# Patient Record
Sex: FEMALE | Race: BLACK OR AFRICAN AMERICAN | Employment: FULL TIME | URBAN - METROPOLITAN AREA
[De-identification: names, ages, dates, MRNs, and addresses within clinical notes are randomized per-mention and may not be internally consistent; named-entity substitution may affect disease eponyms.]

---

## 2017-05-18 ENCOUNTER — TELEPHONE (OUTPATIENT)
Dept: FAMILY MEDICINE | Facility: CLINIC | Age: 27
End: 2017-05-18

## 2017-05-18 NOTE — LETTER
[facility]  [address]  [address]  [facility] [address] [phone]                                                                                                           Sathya Nelson  [address]   [facility] [address]    May 18, 2017          Dear Sathya Nelson,      We care about your well-being!  In order to ensure we are providing the best quality care, we have reviewed your chart and see that you are due for:     ___x__ Physical   ___x__ Pap Smear   _____ Mammogram   _____ Diabetes Followup   _____ Hypertension Followup   _____ Cholesterol Followup (Provider Appointment)   _____ Cholesterol Test (Lab-Only Appointment)   _____ Other:       We can assist you in scheduling these appointments at [phone].    If you have had (or plan to have) any of these tests done at a facility other than [facility] or a [facility], please have the results from these tests sent to your primary physician at [facility].           Sincerely,    Nicole Siegler, PA

## 2017-05-18 NOTE — TELEPHONE ENCOUNTER
Panel Management Review      Patient has the following on her problem list: None      Composite cancer screening  Chart review shows that this patient is due/due soon for the following Pap Smear  Summary:    Patient is due/failing the following:   PAP and PHYSICAL    Action needed:   Patient needs office visit for Pap/Physical.    Type of outreach:    Sent letter.    Questions for provider review:    None                                                                                                                                    Jayda Fleming LPN     Chart routed to Care Team .

## 2017-10-01 ENCOUNTER — APPOINTMENT (OUTPATIENT)
Dept: GENERAL RADIOLOGY | Facility: CLINIC | Age: 27
End: 2017-10-01
Attending: EMERGENCY MEDICINE
Payer: COMMERCIAL

## 2017-10-01 ENCOUNTER — NURSE TRIAGE (OUTPATIENT)
Dept: NURSING | Facility: CLINIC | Age: 27
End: 2017-10-01

## 2017-10-01 ENCOUNTER — APPOINTMENT (OUTPATIENT)
Dept: CT IMAGING | Facility: CLINIC | Age: 27
End: 2017-10-01
Attending: EMERGENCY MEDICINE
Payer: COMMERCIAL

## 2017-10-01 ENCOUNTER — HOSPITAL ENCOUNTER (EMERGENCY)
Facility: CLINIC | Age: 27
Discharge: HOME | End: 2017-10-02
Attending: EMERGENCY MEDICINE | Admitting: EMERGENCY MEDICINE
Payer: COMMERCIAL

## 2017-10-01 DIAGNOSIS — R51.9 NONINTRACTABLE HEADACHE, UNSPECIFIED CHRONICITY PATTERN, UNSPECIFIED HEADACHE TYPE: ICD-10-CM

## 2017-10-01 DIAGNOSIS — M25.511 ACUTE PAIN OF RIGHT SHOULDER: ICD-10-CM

## 2017-10-01 DIAGNOSIS — S06.9X1A TRAUMATIC BRAIN INJURY, WITH LOSS OF CONSCIOUSNESS OF 30 MINUTES OR LESS, INITIAL ENCOUNTER (H): Primary | ICD-10-CM

## 2017-10-01 DIAGNOSIS — M54.2 NECK PAIN: ICD-10-CM
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
## 2017-10-01 DIAGNOSIS — M25.561 ACUTE PAIN OF RIGHT KNEE: ICD-10-CM

## 2017-10-01 DIAGNOSIS — M25.562 ACUTE PAIN OF LEFT KNEE: ICD-10-CM

## 2017-10-01 LAB
ALBUMIN UR-MCNC: NEGATIVE MG/DL
ANION GAP SERPL CALCULATED.3IONS-SCNC: 8 MMOL/L (ref 3–14)
APPEARANCE UR: CLEAR
BACTERIA #/AREA URNS HPF: ABNORMAL /HPF
BASOPHILS # BLD AUTO: 0.1 10E9/L (ref 0–0.2)
BASOPHILS NFR BLD AUTO: 0.6 %
BILIRUB UR QL STRIP: NEGATIVE
BUN SERPL-MCNC: 6 MG/DL (ref 7–30)
CALCIUM SERPL-MCNC: 8.5 MG/DL (ref 8.5–10.1)
CHLORIDE SERPL-SCNC: 105 MMOL/L (ref 94–109)
CO2 SERPL-SCNC: 24 MMOL/L (ref 20–32)
COLOR UR AUTO: ABNORMAL
CREAT SERPL-MCNC: 0.52 MG/DL (ref 0.52–1.04)
DIFFERENTIAL METHOD BLD: NORMAL
EOSINOPHIL # BLD AUTO: 0.1 10E9/L (ref 0–0.7)
EOSINOPHIL NFR BLD AUTO: 0.7 %
ERYTHROCYTE [DISTWIDTH] IN BLOOD BY AUTOMATED COUNT: 14 % (ref 10–15)
GFR SERPL CREATININE-BSD FRML MDRD: >90 ML/MIN/1.7M2
GLUCOSE SERPL-MCNC: 84 MG/DL (ref 70–99)
GLUCOSE UR STRIP-MCNC: NEGATIVE MG/DL
HCG UR QL: NEGATIVE
HCT VFR BLD AUTO: 35.4 % (ref 35–47)
HGB BLD-MCNC: 12 G/DL (ref 11.7–15.7)
HGB UR QL STRIP: NEGATIVE
IMM GRANULOCYTES # BLD: 0 10E9/L (ref 0–0.4)
IMM GRANULOCYTES NFR BLD: 0.2 %
KETONES UR STRIP-MCNC: NEGATIVE MG/DL
LEUKOCYTE ESTERASE UR QL STRIP: NEGATIVE
LYMPHOCYTES # BLD AUTO: 2 10E9/L (ref 0.8–5.3)
LYMPHOCYTES NFR BLD AUTO: 21.9 %
MCH RBC QN AUTO: 29.6 PG (ref 26.5–33)
MCHC RBC AUTO-ENTMCNC: 33.9 G/DL (ref 31.5–36.5)
MCV RBC AUTO: 87 FL (ref 78–100)
MONOCYTES # BLD AUTO: 1.2 10E9/L (ref 0–1.3)
MONOCYTES NFR BLD AUTO: 13.7 %
MUCOUS THREADS #/AREA URNS LPF: PRESENT /LPF
NEUTROPHILS # BLD AUTO: 5.7 10E9/L (ref 1.6–8.3)
NEUTROPHILS NFR BLD AUTO: 62.9 %
NITRATE UR QL: NEGATIVE
NRBC # BLD AUTO: 0 10*3/UL
NRBC BLD AUTO-RTO: 0 /100
PH UR STRIP: 6.5 PH (ref 5–7)
PLATELET # BLD AUTO: 223 10E9/L (ref 150–450)
POTASSIUM SERPL-SCNC: 3.4 MMOL/L (ref 3.4–5.3)
RBC # BLD AUTO: 4.05 10E12/L (ref 3.8–5.2)
RBC #/AREA URNS AUTO: <1 /HPF (ref 0–2)
SODIUM SERPL-SCNC: 137 MMOL/L (ref 133–144)
SOURCE: ABNORMAL
SP GR UR STRIP: 1.01 (ref 1–1.03)
SQUAMOUS #/AREA URNS AUTO: 1 /HPF (ref 0–1)
UROBILINOGEN UR STRIP-MCNC: NORMAL MG/DL (ref 0–2)
WBC # BLD AUTO: 9.1 10E9/L (ref 4–11)
WBC #/AREA URNS AUTO: 1 /HPF (ref 0–2)

## 2017-10-01 PROCEDURE — 72125 CT NECK SPINE W/O DYE: CPT

## 2017-10-01 PROCEDURE — 85025 COMPLETE CBC W/AUTO DIFF WBC: CPT | Performed by: EMERGENCY MEDICINE

## 2017-10-01 PROCEDURE — 96374 THER/PROPH/DIAG INJ IV PUSH: CPT

## 2017-10-01 PROCEDURE — 81025 URINE PREGNANCY TEST: CPT | Performed by: EMERGENCY MEDICINE

## 2017-10-01 PROCEDURE — 73030 X-RAY EXAM OF SHOULDER: CPT | Mod: RT

## 2017-10-01 PROCEDURE — 70450 CT HEAD/BRAIN W/O DYE: CPT

## 2017-10-01 PROCEDURE — 25000128 H RX IP 250 OP 636: Performed by: EMERGENCY MEDICINE

## 2017-10-01 PROCEDURE — 76705 ECHO EXAM OF ABDOMEN: CPT

## 2017-10-01 PROCEDURE — 73562 X-RAY EXAM OF KNEE 3: CPT | Mod: 50

## 2017-10-01 PROCEDURE — 80048 BASIC METABOLIC PNL TOTAL CA: CPT | Performed by: EMERGENCY MEDICINE

## 2017-10-01 PROCEDURE — 96375 TX/PRO/DX INJ NEW DRUG ADDON: CPT

## 2017-10-01 PROCEDURE — 99285 EMERGENCY DEPT VISIT HI MDM: CPT | Mod: 25

## 2017-10-01 PROCEDURE — 71010 XR CHEST 1 VW: CPT

## 2017-10-01 PROCEDURE — 81001 URINALYSIS AUTO W/SCOPE: CPT | Performed by: EMERGENCY MEDICINE

## 2017-10-01 PROCEDURE — 96361 HYDRATE IV INFUSION ADD-ON: CPT

## 2017-10-01 RX ORDER — IBUPROFEN 600 MG/1
600 TABLET, FILM COATED ORAL EVERY 8 HOURS PRN
Qty: 30 TABLET | Refills: 0 | Status: ON HOLD | OUTPATIENT
Start: 2017-10-01 | End: 2021-02-11

## 2017-10-01 RX ORDER — ONDANSETRON 4 MG/1
4 TABLET, ORALLY DISINTEGRATING ORAL EVERY 8 HOURS PRN
Qty: 10 TABLET | Refills: 0 | Status: SHIPPED | OUTPATIENT
Start: 2017-10-01 | End: 2017-10-01

## 2017-10-01 RX ORDER — IBUPROFEN 600 MG/1
600 TABLET, FILM COATED ORAL EVERY 8 HOURS PRN
Qty: 30 TABLET | Refills: 0 | Status: SHIPPED | OUTPATIENT
Start: 2017-10-01 | End: 2017-10-01

## 2017-10-01 RX ORDER — ONDANSETRON 4 MG/1
4 TABLET, ORALLY DISINTEGRATING ORAL EVERY 8 HOURS PRN
Qty: 10 TABLET | Refills: 0 | Status: ON HOLD | OUTPATIENT
Start: 2017-10-01 | End: 2021-03-21

## 2017-10-01 RX ORDER — OXYCODONE AND ACETAMINOPHEN 5; 325 MG/1; MG/1
1-2 TABLET ORAL EVERY 4 HOURS PRN
Qty: 15 TABLET | Refills: 0 | Status: ON HOLD | OUTPATIENT
Start: 2017-10-01 | End: 2021-02-11

## 2017-10-01 RX ORDER — HYDROMORPHONE HYDROCHLORIDE 1 MG/ML
0.5 INJECTION, SOLUTION INTRAMUSCULAR; INTRAVENOUS; SUBCUTANEOUS
Status: DISCONTINUED | OUTPATIENT
Start: 2017-10-01 | End: 2017-10-02 | Stop reason: HOSPADM

## 2017-10-01 RX ORDER — OXYCODONE AND ACETAMINOPHEN 5; 325 MG/1; MG/1
1-2 TABLET ORAL EVERY 4 HOURS PRN
Qty: 15 TABLET | Refills: 0 | Status: SHIPPED | OUTPATIENT
Start: 2017-10-01 | End: 2017-10-01

## 2017-10-01 RX ORDER — ONDANSETRON 2 MG/ML
4 INJECTION INTRAMUSCULAR; INTRAVENOUS ONCE
Status: COMPLETED | OUTPATIENT
Start: 2017-10-01 | End: 2017-10-01

## 2017-10-01 RX ADMIN — SODIUM CHLORIDE 1000 ML: 9 INJECTION, SOLUTION INTRAVENOUS at 22:40

## 2017-10-01 RX ADMIN — ONDANSETRON 4 MG: 2 SOLUTION INTRAMUSCULAR; INTRAVENOUS at 22:41

## 2017-10-01 RX ADMIN — HYDROMORPHONE HYDROCHLORIDE 0.5 MG: 1 INJECTION, SOLUTION INTRAMUSCULAR; INTRAVENOUS; SUBCUTANEOUS at 22:42

## 2017-10-01 ASSESSMENT — ENCOUNTER SYMPTOMS
NECK PAIN: 1
HEADACHES: 1
SHORTNESS OF BREATH: 1

## 2017-10-01 NOTE — ED AVS SNAPSHOT
Emergency Department    [address] [address]    Phone:  [phone]    Fax:  [phone]                                       Sathya Nelson   MRN: [id number]    Department:   Emergency Department   Date of Visit:  10/1/2017           After Visit Summary Signature Page     I have received my discharge instructions, and my questions have been answered. I have discussed any challenges I see with this plan with the nurse or doctor.    ..........................................................................................................................................  Patient/Patient Representative Signature      ..........................................................................................................................................  Patient Representative Print Name and Relationship to Patient    ..................................................               ................................................  Date                                            Time    ..........................................................................................................................................  Reviewed by Signature/Title    ...................................................              ..............................................  Date                                                            Time
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
## 2017-10-01 NOTE — TELEPHONE ENCOUNTER
"  Reason for Disposition    [1] MILD-MODERATE pain AND [2] constant AND [3] present > 2 hours    Additional Information    Negative: Shock suspected (e.g., cold/pale/clammy skin, too weak to stand, low BP, rapid pulse)    Negative: Difficult to awaken or acting confused  (e.g., disoriented, slurred speech)    Negative: Passed out (i.e., lost consciousness, collapsed and was not responding)    Negative: Sounds like a life-threatening emergency to the triager    Negative: Chest pain    Negative: Pain is mainly in upper abdomen  (if needed ask: \"is it mainly above the belly button?\")    Negative: Followed an abdomen (stomach) injury    Negative: [1] Abdominal pain AND [2] pregnant < 20 weeks    Negative: [1] Abdominal pain AND [2] pregnant > 20 weeks    Negative: [1] Abdominal pain AND [2] postpartum < 1 month since delivery    Negative: [1] SEVERE pain (e.g., excruciating) AND [2] present > 1 hour    Negative: [1] SEVERE pain AND [2] age > 60    Negative: [1] Vomiting AND [2] contains red blood or black (\"coffee ground\") material  (Exception: few red streaks in vomit that only happened once)    Negative: Blood in bowel movements   (Exception: blood on surface of BM with constipation)    Negative: Black or tarry bowel movements  (Exception: chronic-unchanged  black-grey bowel movements AND is taking iron pills or Pepto-bismol)    Negative: Patient sounds very sick or weak to the triager    Protocols used: ABDOMINAL PAIN - FEMALE-ADULT-  Patient is having chest pain and abdominal pain on left side. Patient states she feels anxious with the pains. Triager advised patient to be seen in ED  "

## 2017-10-02 VITALS
DIASTOLIC BLOOD PRESSURE: 57 MMHG | OXYGEN SATURATION: 100 % | TEMPERATURE: 99.3 F | RESPIRATION RATE: 20 BRPM | SYSTOLIC BLOOD PRESSURE: 92 MMHG | HEIGHT: 65 IN
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
## 2017-10-04 ENCOUNTER — OFFICE VISIT (OUTPATIENT)
Dept: INTERNAL MEDICINE | Facility: CLINIC | Age: 27
End: 2017-10-04
Payer: COMMERCIAL

## 2017-10-04 VITALS
DIASTOLIC BLOOD PRESSURE: 62 MMHG | TEMPERATURE: 98.6 F | OXYGEN SATURATION: 99 % | HEIGHT: 65 IN | SYSTOLIC BLOOD PRESSURE: 100 MMHG | HEART RATE: 66 BPM | WEIGHT: 128.2 LBS | BODY MASS INDEX: 21.36 KG/M2

## 2017-10-04 DIAGNOSIS — S06.0X9D CONCUSSION WITH LOSS OF CONSCIOUSNESS, SUBSEQUENT ENCOUNTER: ICD-10-CM

## 2017-10-04 DIAGNOSIS — S29.012D UPPER BACK STRAIN, SUBSEQUENT ENCOUNTER: ICD-10-CM

## 2017-10-04 DIAGNOSIS — S06.9X9A TRAUMATIC BRAIN INJURY WITH BRIEF (LESS THAN 1 HOUR) LOSS OF CONSCIOUSNESS (H): ICD-10-CM
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
## 2017-10-04 PROCEDURE — 99214 OFFICE O/P EST MOD 30 MIN: CPT | Performed by: PHYSICIAN ASSISTANT

## 2017-10-04 RX ORDER — HYDROCODONE BITARTRATE AND ACETAMINOPHEN 5; 325 MG/1; MG/1
1 TABLET ORAL EVERY 6 HOURS PRN
Qty: 10 TABLET | Refills: 0 | Status: ON HOLD | OUTPATIENT
Start: 2017-10-04 | End: 2021-02-11

## 2017-10-04 RX ORDER — TIZANIDINE 2 MG/1
2 TABLET ORAL 3 TIMES DAILY
Qty: 30 TABLET | Refills: 1 | Status: ON HOLD | OUTPATIENT
Start: 2017-10-04 | End: 2021-03-21

## 2017-10-04 NOTE — NURSING NOTE
"Chief Complaint   Patient presents with     Hospital F/U     10/01 [facility]        Initial /62 (BP Location: Left arm, Patient Position: Chair, Cuff Size: Adult Regular)  Pulse 66  Temp 98.6  F (37  C) (Oral)  Ht 5' 5\" (1.651 m)  Wt 128 lb 3.2 oz (58.2 kg)  LMP 09/20/2017  SpO2 99%  BMI 21.33 kg/m2 Estimated body mass index is 21.33 kg/(m^2) as calculated from the following:    Height as of this encounter: 5' 5\" (1.651 m).    Weight as of this encounter: 128 lb 3.2 oz (58.2 kg).  Medication Reconciliation: complete    "
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
## 2017-10-14 ENCOUNTER — HOSPITAL ENCOUNTER (EMERGENCY)
Facility: CLINIC | Age: 27
Discharge: HOME | End: 2017-10-15
Attending: EMERGENCY MEDICINE | Admitting: EMERGENCY MEDICINE

## 2017-10-14 DIAGNOSIS — R11.2 NON-INTRACTABLE VOMITING WITH NAUSEA, UNSPECIFIED VOMITING TYPE: ICD-10-CM

## 2017-10-14 PROCEDURE — 99284 EMERGENCY DEPT VISIT MOD MDM: CPT | Mod: 25

## 2017-10-14 PROCEDURE — 96361 HYDRATE IV INFUSION ADD-ON: CPT

## 2017-10-14 PROCEDURE — 96374 THER/PROPH/DIAG INJ IV PUSH: CPT

## 2017-10-15 VITALS
HEART RATE: 89 BPM | SYSTOLIC BLOOD PRESSURE: 101 MMHG | TEMPERATURE: 98.9 F | BODY MASS INDEX: 21.33 KG/M2 | HEIGHT: 65 IN | RESPIRATION RATE: 20 BRPM | WEIGHT: 128 LBS | DIASTOLIC BLOOD PRESSURE: 59 MMHG | OXYGEN SATURATION: 100 %

## 2017-10-15 LAB
ALBUMIN SERPL-MCNC: 3.3 G/DL (ref 3.4–5)
ALP SERPL-CCNC: 69 U/L (ref 40–150)
ALT SERPL W P-5'-P-CCNC: 12 U/L (ref 0–50)
ANION GAP SERPL CALCULATED.3IONS-SCNC: 7 MMOL/L (ref 3–14)
AST SERPL W P-5'-P-CCNC: 11 U/L (ref 0–45)
BASOPHILS # BLD AUTO: 0.1 10E9/L (ref 0–0.2)
BASOPHILS NFR BLD AUTO: 0.5 %
BILIRUB SERPL-MCNC: 0.1 MG/DL (ref 0.2–1.3)
BUN SERPL-MCNC: 9 MG/DL (ref 7–30)
CALCIUM SERPL-MCNC: 8.3 MG/DL (ref 8.5–10.1)
CHLORIDE SERPL-SCNC: 107 MMOL/L (ref 94–109)
CO2 SERPL-SCNC: 26 MMOL/L (ref 20–32)
CREAT SERPL-MCNC: 0.57 MG/DL (ref 0.52–1.04)
DIFFERENTIAL METHOD BLD: ABNORMAL
EOSINOPHIL # BLD AUTO: 0.2 10E9/L (ref 0–0.7)
EOSINOPHIL NFR BLD AUTO: 2.1 %
ERYTHROCYTE [DISTWIDTH] IN BLOOD BY AUTOMATED COUNT: 14 % (ref 10–15)
GFR SERPL CREATININE-BSD FRML MDRD: >90 ML/MIN/1.7M2
GLUCOSE SERPL-MCNC: 86 MG/DL (ref 70–99)
HCG SERPL QL: NEGATIVE
HCT VFR BLD AUTO: 34 % (ref 35–47)
HGB BLD-MCNC: 11.4 G/DL (ref 11.7–15.7)
IMM GRANULOCYTES # BLD: 0 10E9/L (ref 0–0.4)
IMM GRANULOCYTES NFR BLD: 0.1 %
LIPASE SERPL-CCNC: 200 U/L (ref 73–393)
LYMPHOCYTES # BLD AUTO: 1.9 10E9/L (ref 0.8–5.3)
LYMPHOCYTES NFR BLD AUTO: 20.9 %
MCH RBC QN AUTO: 29.6 PG (ref 26.5–33)
MCHC RBC AUTO-ENTMCNC: 33.5 G/DL (ref 31.5–36.5)
MCV RBC AUTO: 88 FL (ref 78–100)
MONOCYTES # BLD AUTO: 1.6 10E9/L (ref 0–1.3)
MONOCYTES NFR BLD AUTO: 16.9 %
NEUTROPHILS # BLD AUTO: 5.5 10E9/L (ref 1.6–8.3)
NEUTROPHILS NFR BLD AUTO: 59.5 %
PLATELET # BLD AUTO: 207 10E9/L (ref 150–450)
POTASSIUM SERPL-SCNC: 3.9 MMOL/L (ref 3.4–5.3)
PROT SERPL-MCNC: 7.2 G/DL (ref 6.8–8.8)
RBC # BLD AUTO: 3.85 10E12/L (ref 3.8–5.2)
SODIUM SERPL-SCNC: 140 MMOL/L (ref 133–144)
WBC # BLD AUTO: 9.2 10E9/L (ref 4–11)

## 2017-10-15 PROCEDURE — 25000128 H RX IP 250 OP 636: Performed by: EMERGENCY MEDICINE

## 2017-10-15 PROCEDURE — 80053 COMPREHEN METABOLIC PANEL: CPT | Performed by: EMERGENCY MEDICINE

## 2017-10-15 PROCEDURE — 85025 COMPLETE CBC W/AUTO DIFF WBC: CPT | Performed by: EMERGENCY MEDICINE

## 2017-10-15 PROCEDURE — 83690 ASSAY OF LIPASE: CPT | Performed by: EMERGENCY MEDICINE

## 2017-10-15 PROCEDURE — 84703 CHORIONIC GONADOTROPIN ASSAY: CPT | Performed by: EMERGENCY MEDICINE

## 2017-10-15 RX ORDER — SODIUM CHLORIDE 9 MG/ML
1000 INJECTION, SOLUTION INTRAVENOUS CONTINUOUS
Status: DISCONTINUED | OUTPATIENT
Start: 2017-10-15 | End: 2017-10-15 | Stop reason: HOSPADM

## 2017-10-15 RX ORDER — ONDANSETRON 2 MG/ML
4 INJECTION INTRAMUSCULAR; INTRAVENOUS EVERY 30 MIN PRN
Status: DISCONTINUED | OUTPATIENT
Start: 2017-10-15 | End: 2017-10-15 | Stop reason: HOSPADM

## 2017-10-15 RX ADMIN — SODIUM CHLORIDE 1000 ML: 9 INJECTION, SOLUTION INTRAVENOUS at 00:40

## 2017-10-15 RX ADMIN — ONDANSETRON 4 MG: 2 SOLUTION INTRAMUSCULAR; INTRAVENOUS at 00:40

## 2017-10-15 ASSESSMENT — ENCOUNTER SYMPTOMS
ABDOMINAL PAIN: 1
CONSTIPATION: 1
NAUSEA: 1
VOMITING: 1
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
## 2017-10-15 NOTE — ED NOTES
Pt stated she needed to leave due to family emergency. IV removed, given basic follow-up instructions. Told if not improved to follow up with PCP or return ED visit.

## 2018-02-27 ENCOUNTER — HOSPITAL ENCOUNTER (EMERGENCY)
Facility: CLINIC | Age: 28
Discharge: HOME | End: 2018-02-27
Attending: EMERGENCY MEDICINE | Admitting: EMERGENCY MEDICINE

## 2018-02-27 ENCOUNTER — APPOINTMENT (OUTPATIENT)
Dept: GENERAL RADIOLOGY | Facility: CLINIC | Age: 28
End: 2018-02-27
Attending: EMERGENCY MEDICINE

## 2018-02-27 VITALS
SYSTOLIC BLOOD PRESSURE: 118 MMHG | DIASTOLIC BLOOD PRESSURE: 66 MMHG | RESPIRATION RATE: 22 BRPM | HEART RATE: 99 BPM | TEMPERATURE: 97.8 F | HEIGHT: 65 IN | OXYGEN SATURATION: 99 % | BODY MASS INDEX: 21.45 KG/M2 | WEIGHT: 128.75 LBS

## 2018-02-27 DIAGNOSIS — R05.9 COUGH: ICD-10-CM

## 2018-02-27 PROCEDURE — 94640 AIRWAY INHALATION TREATMENT: CPT

## 2018-02-27 PROCEDURE — 99283 EMERGENCY DEPT VISIT LOW MDM: CPT | Mod: 25

## 2018-02-27 PROCEDURE — 25000125 ZZHC RX 250: Performed by: EMERGENCY MEDICINE

## 2018-02-27 PROCEDURE — 71046 X-RAY EXAM CHEST 2 VIEWS: CPT

## 2018-02-27 PROCEDURE — 40000275 ZZH STATISTIC RCP TIME EA 10 MIN

## 2018-02-27 RX ORDER — ALBUTEROL SULFATE 90 UG/1
2 AEROSOL, METERED RESPIRATORY (INHALATION)
Qty: 1 INHALER | Refills: 2 | Status: ON HOLD | OUTPATIENT
Start: 2018-02-27 | End: 2021-02-11

## 2018-02-27 RX ORDER — IPRATROPIUM BROMIDE AND ALBUTEROL SULFATE 2.5; .5 MG/3ML; MG/3ML
3 SOLUTION RESPIRATORY (INHALATION) ONCE
Status: COMPLETED | OUTPATIENT
Start: 2018-02-27 | End: 2018-02-27

## 2018-02-27 RX ORDER — GUAIFENESIN/DEXTROMETHORPHAN 100-10MG/5
5 SYRUP ORAL EVERY 4 HOURS PRN
Status: ON HOLD | COMMUNITY
End: 2021-02-11

## 2018-02-27 RX ORDER — CODEINE PHOSPHATE AND GUAIFENESIN 10; 100 MG/5ML; MG/5ML
1-2 SOLUTION ORAL EVERY 4 HOURS PRN
Qty: 120 ML | Refills: 0 | Status: ON HOLD | OUTPATIENT
Start: 2018-02-27 | End: 2021-02-11

## 2018-02-27 RX ADMIN — IPRATROPIUM BROMIDE AND ALBUTEROL SULFATE 3 ML: .5; 3 SOLUTION RESPIRATORY (INHALATION) at 08:46

## 2018-02-27 ASSESSMENT — ENCOUNTER SYMPTOMS
COUGH: 1
FEVER: 0
MYALGIAS: 1

## 2018-02-27 NOTE — ED AVS SNAPSHOT
Emergency Department    [address] [place] [id number]    Phone:  [phone]    Fax:  [phone]                                       Sathya Nelson   MRN: [id number]    Department:   Emergency Department   Date of Visit:  2/27/2018           Patient Information     Date Of Birth          1990        Your diagnoses for this visit were:     Cough        You were seen by Rodolfo Flanagan MD.      Follow-up Information     Follow up with No Ref-Primary, Physician In 1 day.        Discharge Instructions         Cough    Everyone has had a cough as part of the common cold, flu, or bronchitis. This kind of cough occurs along with an achy feeling, low-grade fever, nasal and sinus congestion, and a scratchy or sore throat. This usually gets better in 2 to 3 weeks. A cough that lasts longer than 3 weeks may be due to other causes.  If your cough does not improve over the next 2 weeks, further testing may be needed. Follow up with your healthcare provider as advised. Cough suppressants may be recommended. Based on your exam today, the exact cause of your cough is not certain. Below are some common causes for persistent cough.  Smokers cough  Smoker s cough doesn t go away. If you continue to smoke, it only gets worse. The cough is from irritation in the air passages. Talk to your healthcare provider about quitting. Medicines or nicotine-replacement products, like gum or the patch, may make quitting easier.  Postnasal drip  A cough that is worse at night may be due to postnasal drip. Excess mucus in the nose drains from the back of your nose to your throat. This triggers the cough reflex. Postnasal drip may be due to a sinus infection or allergy. Common allergens include dust, tobacco smoke (both inhaled and secondhand smoke), environmental pollutants, pollen, mold, pets, cleaning agents, room deodorizers, and chemical fumes. Over-the-counter antihistamines or decongestants may be helpful  for allergies. A sinus infection may requires antibiotic treatment. See your healthcare provider if symptoms continue.  Medicines  Certain prescribed medicines can cause a chronic cough in some people:    ACE inhibitors for high blood pressure. These include benazepril, captopril, enalapril, fosinopril, lisinopril, quinapril, ramipril, and others.    Beta-blockers for high blood pressure and other conditions. These include propranolol, atenolol, metoprolol, nadolol, and others.  Let your healthcare provider know if you are taking any of these.  Asthma  Cough may be the only sign of mild asthma. You may have tests to find out if asthma is causing your cough. You may also take asthma medicine on a trial basis.  Acid reflux (heartburn, GERD)  The esophagus is the tube that carries food from the mouth to the stomach. A valve at its lower end prevents stomach acids from flowing upward. If this valve does not work properly, acid from the stomach enters the esophagus. This may cause a burning pain in the upper abdomen or lower chest, belching, or cough. Symptoms are often worse when lying flat. Avoid eating or drinking before bedtime. Try using extra pillows to raise your upper body, or place 4-inch blocks under the head of your bed. You may try an over-the-counter antacid or an acid-blocking medicine such as famotidine, cimetidine, ranitidine, esomeprazole, lansoprazole, or omeprazole. Stronger medicines for this condition can be prescribed by your healthcare provider.  Follow-up care  Follow up with your healthcare provider, or as advised, if your cough does not improve. Further testing may be needed.  Note: If an X-ray was taken, a specialist will review it. You will be notified of any new findings that may affect your care.  When to seek medical advice  Call your healthcare provider right away if any of these occur:    Mild wheezing or difficulty breathing    Fever of 100.4 F (38 C) or higher, or as directed by your  healthcare provider    Unexpected weight loss    Coughing up large amounts of colored sputum    Night sweats (sheets and pajamas get soaking wet)  Call 911, or get immediate medical care  Contact emergency services right away if any of these occur:    Coughing up blood    Moderate to severe trouble breathing or wheezing  Date Last Reviewed: 9/13/2015 2000-2017 [employer]. [address] [place] [address], [place] [address]. All rights reserved. This information is not intended as a substitute for professional medical care. Always follow your healthcare professional's instructions.          24 Hour Appointment Hotline       To make an appointment at any [facility], call [phone] ([phone]). If you don't have a family doctor or clinic, we will help you find one. [facility] clinics are conveniently located to serve the needs of you and your family.             Review of your medicines      START taking        Dose / Directions Last dose taken    albuterol 108 (90 BASE) MCG/ACT Inhaler   Commonly known as:  PROAIR HFA   Dose:  2 puff   Quantity:  1 Inhaler        Inhale 2 puffs into the lungs every 2 hours as needed for shortness of breath / dyspnea or wheezing   Refills:  2        guaiFENesin-codeine 100-10 MG/5ML Soln solution   Commonly known as:  ROBITUSSIN AC   Dose:  1-2 tsp.   Quantity:  120 mL        Take 5-10 mLs by mouth every 4 hours as needed for cough   Refills:  0          Our records show that you are taking the medicines listed below. If these are incorrect, please call your family doctor or clinic.        Dose / Directions Last dose taken    cetirizine 10 MG tablet   Commonly known as:  zyrTEC   Dose:  10 mg   Quantity:  30 tablet        Take 1 tablet (10 mg) by mouth every evening   Refills:  1        erythromycin ophthalmic ointment   Commonly known as:  ROMYCIN   Dose:  1 Application   Quantity:  1 g        Place 1 Application into the right eye 2 times daily   Refills:  0         fluticasone 50 MCG/ACT spray   Commonly known as:  FLONASE   Dose:  2 spray   Quantity:  16 g        Spray 2 sprays into both nostrils daily   Refills:  1        guaiFENesin-dextromethorphan 100-10 MG/5ML syrup   Commonly known as:  ROBITUSSIN DM   Dose:  5 mL        Take 5 mLs by mouth every 4 hours as needed for cough   Refills:  0        HYDROcodone-acetaminophen 5-325 MG per tablet   Commonly known as:  NORCO   Dose:  1 tablet   Quantity:  10 tablet        Take 1 tablet by mouth every 6 hours as needed   Refills:  0        ibuprofen 600 MG tablet   Commonly known as:  ADVIL/MOTRIN   Dose:  600 mg   Quantity:  30 tablet        Take 1 tablet (600 mg) by mouth every 8 hours as needed for moderate pain   Refills:  0        ondansetron 4 MG ODT tab   Commonly known as:  ZOFRAN ODT   Dose:  4 mg   Quantity:  10 tablet        Take 1 tablet (4 mg) by mouth every 8 hours as needed for nausea   Refills:  0        oxyCODONE-acetaminophen 5-325 MG per tablet   Commonly known as:  PERCOCET   Dose:  1-2 tablet   Quantity:  15 tablet        Take 1-2 tablets by mouth every 4 hours as needed for pain   Refills:  0        ranitidine 150 MG tablet   Commonly known as:  ZANTAC   Dose:  150 mg   Quantity:  60 tablet        Take 1 tablet (150 mg) by mouth 2 times daily   Refills:  1        sucralfate 1 GM tablet   Commonly known as:  CARAFATE   Dose:  1 g   Quantity:  40 tablet        Take 1 tablet (1 g) by mouth 4 times daily   Refills:  1        tiZANidine 2 MG tablet   Commonly known as:  ZANAFLEX   Dose:  2 mg   Quantity:  30 tablet        Take 1 tablet (2 mg) by mouth 3 times daily   Refills:  1                Prescriptions were sent or printed at these locations (2 Prescriptions)                   Other Prescriptions                Printed at Department/Unit printer (2 of 2)         albuterol (PROAIR HFA) 108 (90 BASE) MCG/ACT Inhaler               guaiFENesin-codeine (ROBITUSSIN AC) 100-10 MG/5ML SOLN solution                 Procedures and tests performed during your visit     Chest XR,  PA & LAT      Orders Needing Specimen Collection     None      Pending Results     Date and Time Order Name Status Description    2/27/2018 0825 Chest XR,  PA & LAT Preliminary             Pending Culture Results     No orders found from 2/25/2018 to 2/28/2018.            Pending Results Instructions     If you had any lab results that were not finalized at the time of your Discharge, you can call the ED Lab Result RN at [phone]. You will be contacted by this team for any positive Lab results or changes in treatment. The nurses are available 7 days a week from 10A to 6:30P.  You can leave a message 24 hours per day and they will return your call.        Test Results From Your Hospital Stay        2/27/2018  9:07 AM      Narrative     CHEST TWO VIEWS  2/27/2018 8:34 AM    HISTORY:  Cough.     COMPARISON:  10/1/2017.        Impression     IMPRESSION:  Negative.                 Clinical Quality Measure: Blood Pressure Screening     Your blood pressure was checked while you were in the emergency department today. The last reading we obtained was  BP: 118/66 . Please read the guidelines below about what these numbers mean and what you should do about them.  If your systolic blood pressure (the top number) is less than 120 and your diastolic blood pressure (the bottom number) is less than 80, then your blood pressure is normal. There is nothing more that you need to do about it.  If your systolic blood pressure (the top number) is 120-139 or your diastolic blood pressure (the bottom number) is 80-89, your blood pressure may be higher than it should be. You should have your blood pressure rechecked within a year by a primary care provider.  If your systolic blood pressure (the top number) is 140 or greater or your diastolic blood pressure (the bottom number) is 90 or greater, you may have high blood pressure. High blood pressure is treatable, but if  "left untreated over time it can put you at risk for heart attack, stroke, or kidney failure. You should have your blood pressure rechecked by a primary care provider within the next 4 weeks.  If your provider in the emergency department today gave you specific instructions to follow-up with your doctor or provider even sooner than that, you should follow that instruction and not wait for up to 4 weeks for your follow-up visit.        Thank you for choosing [facility]       Thank you for choosing [facility] for your care. Our goal is always to provide you with excellent care. Hearing back from our patients is one way we can continue to improve our services. Please take a few minutes to complete the written survey that you may receive in the mail after you visit with us. Thank you!        [employer] Information     [employer] lets you send messages to your doctor, view your test results, renew your prescriptions, schedule appointments and more. To sign up, go to [email or web address] . Click on \"Log in\" on the left side of the screen, which will take you to the Welcome page. Then click on \"Sign up Now\" on the right side of the page.     You will be asked to enter the access code listed below, as well as some personal information. Please follow the directions to create your username and password.     Your access code is: [email or web address]  Expires: 2018  9:36 AM     Your access code will  in 90 days. If you need help or a new code, please call your [facility] clinic or [phone].        Care EveryWhere ID     This is your Care EveryWhere ID. This could be used by other organizations to access your [facility] medical records  [id number]        Equal Access to Services     [facility] : Hadbob Prater, waprudence gruber, qaybsharon llamas. So [facility] [phone].    ATENCIÓN: Si habla español, tiene a willams disposición servicios gratuitos de asistencia " blanca Alvaradocindy al [phone].    We comply with applicable federal civil rights laws and [place] laws. We do not discriminate on the basis of race, color, national origin, age, disability, sex, sexual orientation, or gender identity.            After Visit Summary       This is your record. Keep this with you and show to your community pharmacist(s) and doctor(s) at your next visit.

## 2018-02-27 NOTE — DISCHARGE INSTRUCTIONS
Cough    Everyone has had a cough as part of the common cold, flu, or bronchitis. This kind of cough occurs along with an achy feeling, low-grade fever, nasal and sinus congestion, and a scratchy or sore throat. This usually gets better in 2 to 3 weeks. A cough that lasts longer than 3 weeks may be due to other causes.  If your cough does not improve over the next 2 weeks, further testing may be needed. Follow up with your healthcare provider as advised. Cough suppressants may be recommended. Based on your exam today, the exact cause of your cough is not certain. Below are some common causes for persistent cough.  Smokers cough  Smoker s cough doesn t go away. If you continue to smoke, it only gets worse. The cough is from irritation in the air passages. Talk to your healthcare provider about quitting. Medicines or nicotine-replacement products, like gum or the patch, may make quitting easier.  Postnasal drip  A cough that is worse at night may be due to postnasal drip. Excess mucus in the nose drains from the back of your nose to your throat. This triggers the cough reflex. Postnasal drip may be due to a sinus infection or allergy. Common allergens include dust, tobacco smoke (both inhaled and secondhand smoke), environmental pollutants, pollen, mold, pets, cleaning agents, room deodorizers, and chemical fumes. Over-the-counter antihistamines or decongestants may be helpful for allergies. A sinus infection may requires antibiotic treatment. See your healthcare provider if symptoms continue.  Medicines  Certain prescribed medicines can cause a chronic cough in some people:    ACE inhibitors for high blood pressure. These include benazepril, captopril, enalapril, fosinopril, lisinopril, quinapril, ramipril, and others.    Beta-blockers for high blood pressure and other conditions. These include propranolol, atenolol, metoprolol, nadolol, and others.  Let your healthcare provider know if you are taking any of  these.  Asthma  Cough may be the only sign of mild asthma. You may have tests to find out if asthma is causing your cough. You may also take asthma medicine on a trial basis.  Acid reflux (heartburn, GERD)  The esophagus is the tube that carries food from the mouth to the stomach. A valve at its lower end prevents stomach acids from flowing upward. If this valve does not work properly, acid from the stomach enters the esophagus. This may cause a burning pain in the upper abdomen or lower chest, belching, or cough. Symptoms are often worse when lying flat. Avoid eating or drinking before bedtime. Try using extra pillows to raise your upper body, or place 4-inch blocks under the head of your bed. You may try an over-the-counter antacid or an acid-blocking medicine such as famotidine, cimetidine, ranitidine, esomeprazole, lansoprazole, or omeprazole. Stronger medicines for this condition can be prescribed by your healthcare provider.  Follow-up care  Follow up with your healthcare provider, or as advised, if your cough does not improve. Further testing may be needed.  Note: If an X-ray was taken, a specialist will review it. You will be notified of any new findings that may affect your care.  When to seek medical advice  Call your healthcare provider right away if any of these occur:    Mild wheezing or difficulty breathing    Fever of 100.4 F (38 C) or higher, or as directed by your healthcare provider    Unexpected weight loss    Coughing up large amounts of colored sputum    Night sweats (sheets and pajamas get soaking wet)  Call 911, or get immediate medical care  Contact emergency services right away if any of these occur:    Coughing up blood    Moderate to severe trouble breathing or wheezing  Date Last Reviewed: 9/13/2015 2000-2017 [employer]. [address] [place] [address], [place] [address]. All rights reserved. This information is not intended as a substitute for professional medical care. Always  follow your healthcare professional's instructions.

## 2018-02-27 NOTE — ED PROVIDER NOTES
"  History     Chief Complaint:  Cough and Myalgias     HPI   Sathya Nelson is a 27 year old female with a history of fibromyalgia who presents to the emergency department for evaluation of cough and myalgias. The patient states that she has had approximately 10 days of diffuse myalgias and productive cough, which she feels has been worsening since onset. Her cough has been increasingly more productive and has had two episodes of coughing up blood-tingled sputum in the past few days. The patient has been taking Robitussin for her symptoms, with some minor relief. She denies any recent fevers or travel.     Allergies:  No Known Allergies     Medications:    Robitussin  Flonase  Zyrtec  Zantac  Carafate     Past Medical History:    Fibromyalgia    Past Surgical History:    The patient does not have any pertinent past surgical history.    Family History:    DM   HTN    Social History:  Presents alone.   Former Smoker.   Negative for alcohol use.  Marital Status:  Single [1]    Review of Systems   Constitutional: Negative for fever.   Respiratory: Positive for cough.    Musculoskeletal: Positive for myalgias.   All other systems reviewed and are negative.    Physical Exam   Physical Exam    Patient Vitals for the past 24 hrs:   BP Temp Temp src Pulse Resp SpO2 Height Weight   02/27/18 0805 118/66 97.8  F (36.6  C) Temporal 99 22 99 % 1.651 m (5' 5\") 58.4 kg (128 lb 12 oz)       General: Alert and Interactive. Intermittent dry cough.   Head: No signs of trauma.   Mouth/Throat: Oropharynx is clear and moist.   Eyes: Conjunctivae are normal. Pupils are equal, round, and reactive to light.   Neck: Normal range of motion. No nuchal rigidity.   CV: Normal rate and regular rhythm.    Resp: Effort normal and breath sounds normal. No respiratory distress.   GI: Soft. There is no tenderness or guarding.   MSK: Normal range of motion. no edema.   Neuro: The patient is alert and oriented to person, place, and time.  NICOLLE PEREZ, " strength in upper/lower extremities normal and symmetrical.   Sensation normal. Speech normal.  GCS eye subscore is 4. GCS verbal subscore is 5. GCS motor subscore is 6.   Skin: Skin is warm and dry. No rash noted.   Psych: normal mood and affect. behavior is normal.     Emergency Department Course   Imaging:  Radiographic findings were communicated with the patient who voiced understanding of the findings.    XR Chest 2 views:   Negative As per radiology.     Interventions:  0846 Duoneb 3 mL Nebulization     Emergency Department Course:  Nursing notes and vitals reviewed. 0840 I performed an exam of the patient as documented above.     The patient was sent for a Chest XR while in the emergency department, findings above.     0936 I rechecked the patient and discussed the results of her workup thus far.     Findings and plan explained to the Patient. Patient discharged home with instructions regarding supportive care, medications, and reasons to return. The importance of close follow-up was reviewed. The patient was prescribed Albuterol and Robitussin.     Impression & Plan    Medical Decision Making:  Sathya Nelson is a 27 year old female who presents for evaluation of cough. This is consistent with an viral bronchitis type illness.  The patient is out of treatment window for influenza and we have elected to forego influenza rapid test at this time.  Chest XR is unremarkable and with lack of fevers, I doubt pneumonia. Very low risk for PE. The patient felt improved after neb here in the ED. Close followup of primary care physician is indicated and return to the ED for high fevers > 103 for more than 48 hours more, increasing productive cough, shortness of breath, or confusion.  There is no signs of serious bacterial infection such as bacteremia, meningitis, UTI/pyelonephritis, strep pharyngitis, etc.      Diagnosis:    ICD-10-CM   1. Cough R05       Disposition:  discharged to home    Discharge Medications:    Details   albuterol (PROAIR HFA) 108 (90 BASE) MCG/ACT Inhaler Inhale 2 puffs into the lungs every 2 hours as needed for shortness of breath / dyspnea or wheezing, Disp-1 Inhaler, R-2, Local Print      guaiFENesin-codeine (ROBITUSSIN AC) 100-10 MG/5ML SOLN solution Take 5-10 mLs by mouth every 4 hours as needed for cough, Disp-120 mL, R-0, Local Print        ISneha, am serving as a scribe on 2/27/2018 at 8:40 AM to personally document services performed by Rodolfo Flanagan MD based on my observations and the provider's statements to me.     Sneha Mckeon  2/27/2018    EMERGENCY DEPARTMENT       Rodolfo Flanagan MD  02/28/18 0115

## 2018-02-27 NOTE — ED AVS SNAPSHOT
Emergency Department    [address] [facility] [address]    Phone:  [phone]    Fax:  [phone]                                       Sathya Nelson   MRN: [id number]    Department:   Emergency Department   Date of Visit:  2/27/2018           After Visit Summary Signature Page     I have received my discharge instructions, and my questions have been answered. I have discussed any challenges I see with this plan with the nurse or doctor.    ..........................................................................................................................................  Patient/Patient Representative Signature      ..........................................................................................................................................  Patient Representative Print Name and Relationship to Patient    ..................................................               ................................................  Date                                            Time    ..........................................................................................................................................  Reviewed by Signature/Title    ...................................................              ..............................................  Date                                                            Time

## 2019-12-08 ENCOUNTER — HOSPITAL ENCOUNTER (EMERGENCY)
Facility: CLINIC | Age: 29
Discharge: HOME | End: 2019-12-08
Attending: EMERGENCY MEDICINE | Admitting: EMERGENCY MEDICINE

## 2019-12-08 VITALS
RESPIRATION RATE: 16 BRPM | WEIGHT: 132 LBS | BODY MASS INDEX: 21.97 KG/M2 | HEART RATE: 76 BPM | SYSTOLIC BLOOD PRESSURE: 90 MMHG | DIASTOLIC BLOOD PRESSURE: 59 MMHG | OXYGEN SATURATION: 99 % | TEMPERATURE: 98.1 F

## 2019-12-08 DIAGNOSIS — Z53.21 ELOPED FROM EMERGENCY DEPARTMENT: ICD-10-CM

## 2019-12-08 DIAGNOSIS — R10.9 LEFT FLANK PAIN: ICD-10-CM

## 2019-12-08 LAB
ALBUMIN UR-MCNC: 10 MG/DL
ANION GAP SERPL CALCULATED.3IONS-SCNC: 5 MMOL/L (ref 3–14)
APPEARANCE UR: CLEAR
BACTERIA #/AREA URNS HPF: ABNORMAL /HPF
BILIRUB UR QL STRIP: NEGATIVE
BUN SERPL-MCNC: 10 MG/DL (ref 7–30)
CALCIUM SERPL-MCNC: 8.5 MG/DL (ref 8.5–10.1)
CHLORIDE SERPL-SCNC: 108 MMOL/L (ref 94–109)
CO2 SERPL-SCNC: 26 MMOL/L (ref 20–32)
COLOR UR AUTO: YELLOW
CREAT SERPL-MCNC: 0.5 MG/DL (ref 0.52–1.04)
GFR SERPL CREATININE-BSD FRML MDRD: >90 ML/MIN/{1.73_M2}
GLUCOSE SERPL-MCNC: 87 MG/DL (ref 70–99)
GLUCOSE UR STRIP-MCNC: NEGATIVE MG/DL
HCG UR QL: NEGATIVE
HGB UR QL STRIP: NEGATIVE
KETONES UR STRIP-MCNC: NEGATIVE MG/DL
LEUKOCYTE ESTERASE UR QL STRIP: NEGATIVE
MUCOUS THREADS #/AREA URNS LPF: PRESENT /LPF
NITRATE UR QL: NEGATIVE
PH UR STRIP: 6 PH (ref 5–7)
POTASSIUM SERPL-SCNC: 4 MMOL/L (ref 3.4–5.3)
RBC #/AREA URNS AUTO: <1 /HPF (ref 0–2)
SODIUM SERPL-SCNC: 139 MMOL/L (ref 133–144)
SOURCE: ABNORMAL
SP GR UR STRIP: 1.03 (ref 1–1.03)
SQUAMOUS #/AREA URNS AUTO: 2 /HPF (ref 0–1)
UROBILINOGEN UR STRIP-MCNC: NORMAL MG/DL (ref 0–2)
WBC #/AREA URNS AUTO: 1 /HPF (ref 0–5)

## 2019-12-08 PROCEDURE — 80048 BASIC METABOLIC PNL TOTAL CA: CPT | Performed by: EMERGENCY MEDICINE

## 2019-12-08 PROCEDURE — 81025 URINE PREGNANCY TEST: CPT | Performed by: EMERGENCY MEDICINE

## 2019-12-08 PROCEDURE — 99284 EMERGENCY DEPT VISIT MOD MDM: CPT | Mod: 25

## 2019-12-08 PROCEDURE — 81001 URINALYSIS AUTO W/SCOPE: CPT | Performed by: EMERGENCY MEDICINE

## 2019-12-08 PROCEDURE — 36415 COLL VENOUS BLD VENIPUNCTURE: CPT | Performed by: EMERGENCY MEDICINE

## 2019-12-08 RX ORDER — ACETAMINOPHEN 500 MG
500 TABLET ORAL ONCE
Status: DISCONTINUED | OUTPATIENT
Start: 2019-12-08 | End: 2019-12-08

## 2019-12-08 ASSESSMENT — ENCOUNTER SYMPTOMS
FLANK PAIN: 1
CONSTIPATION: 0
NAUSEA: 1
VOMITING: 0
SHORTNESS OF BREATH: 0
DYSURIA: 0
DIARRHEA: 0
APPETITE CHANGE: 1
FREQUENCY: 1

## 2019-12-08 NOTE — ED NOTES
"Transport informed writer that patient refused to go to CT scan stating \"I'm going home\". Writer entered the room and patient is gone. MD notified.  "

## 2019-12-08 NOTE — ED PROVIDER NOTES
History     Chief Complaint:  Flank Pain    The history is provided by the patient.      Sathya Nelson is a 29 year old female with a history of fibromyalgia who presents to the emergency department with her  for evaluation of left flank pain for 1.5 months. The patient states she has had this pain constantly, dull and achy, which improves with Tylenol and ibuprofen and sitting upright, but worsens with laying flat and movement. She has also been intermittently nauseous, with a decrease in appetite, due to nausea after she eats. She had her last dose of Tylenol last night. She is also complaining of urinary frequency and an increase in urgency. This morning, the patient had sharp and stabbing pain to the left flank. Her persisting and worsening left flank pain prompted the patient to seek evaluation in the emergency department today.     Patient denies any dysuria, diarrhea, constipation, bloody school, chest pain, shortness of breath, trauma or hits to the back, or personal or familial history of kidney stones. Last menstrual period was last week.     Allergies:  No Known Drug Allergies    Medications:    Albuterol    Past Medical History:    Fibromyalgia  TBI    Past Surgical History:    The patient does not have any pertinent past surgical history.    Family History:    Hypertension  Diabetes     Social History:  Negative for tobacco use.  Negative for alcohol use.  Negative for drug use.  Marital Status:  Single      Review of Systems   Constitutional: Positive for appetite change.   Respiratory: Negative for shortness of breath.    Cardiovascular: Negative for chest pain.   Gastrointestinal: Positive for nausea. Negative for constipation, diarrhea and vomiting.   Genitourinary: Positive for flank pain (left), frequency and urgency. Negative for dysuria.   All other systems reviewed and are negative.        Physical Exam     Patient Vitals for the past 24 hrs:   BP Temp Temp src Pulse Heart Rate Resp  SpO2 Weight   12/08/19 1330 90/59 -- -- 76 -- -- -- --   12/08/19 1300 99/69 -- -- 78 -- -- 99 % --   12/08/19 1123 112/63 98.1  F (36.7  C) Temporal 84 84 16 99 % 59.9 kg (132 lb)     Physical Exam  General: Alert, no acute distress; nontoxic appearing  HEENT:  Moist mucous membranes. Conjunctiva normal.  CV:  RRR, no m/r/g, skin warm and well perfused  Pulm:  CTAB, no wheezes/rhonchi/rales.  No acute distress, breathing comfortably  GI:  Soft, nontender, nondistended.  No rebound or guarding.  Normal bowel sounds  MSK:  Moving all extremities.  No focal areas of edema, erythema, left CVA tenderness and left para lumbar tenderness; no midline spinal tenderness  Skin:  WWP, no rashes, no lower extremity edema, skin color normal, no diaphoresis  Psych:  Well-appearing, normal affect, regular speech    Emergency Department Course   Imaging:  CT Abdomen/Pelvis with no IV contrast:   Eloped prior to CT.    Laboratory:  BMP: Creatinine: 0.50 (L), o/w WNL     UA with Microscopic: Protein Albumin: 10, Bacteria: Few, Squamous Epithelial: 2 (H), Mucous: Present, o/w Negative  HCG Qualitative Urine POCT: Negative     Emergency Department Course:  Nursing notes and vitals reviewed. 1245 I performed an exam of the patient as documented above.     Blood drawn. This was sent to the lab for further testing, results above.    1351 I rechecked the patient and discussed the results of her workup thus far. The patient decided she does not want to wait for the CT.    Patient eloped.     Impression & Plan    Medical Decision Making:  Sathya Nelson is a 29 year old female who presents with left flank pain for the past month and a half which is been constant and.  She denies any fevers or dysuria.  She is afebrile hemodynamically stable.  No chest pain or shortness of breath.  Abdominal exam benign but she did have mild left CVA/left paralumbar tenderness.  No red flag symptoms to suggest spinal cord compression syndrome.  I did  consider UTI/pyelonephritis, kidney stone, musculoskeletal strain.  Urinalysis not concerning for infection she is not pregnant.  Kidney function normal.  Doubt any cardiothoracic pathology such as PE given low risk for PE and PERC negative.  In the process of work-up, patient declined CT imaging to evaluate for stone burden and eloped from the ER.  At this time, given symptoms been ongoing for 1.5 months, doubt life-threatening cause at this time but she is certainly welcome to return to the ER for any new or worsening symptoms.    Diagnosis:    ICD-10-CM    1. Left flank pain R10.9    2. Eloped from emergency department Z53.21        Disposition:  Eloped     Scribe Disclosure:  I, Clementine Sosa, am serving as a scribe on 12/8/2019 at 12:26 PM to personally document services performed by Mert Malone MD based on my observations and the provider's statements to me.     Clementine Sosa  12/8/2019   [facility] EMERGENCY DEPARTMENT       Mert Malone MD  12/08/19 8931

## 2019-12-08 NOTE — ED TRIAGE NOTES
"Patient reports left flank pain for 1.5 months. Today with sharp pains in left flank, frequency, urgency, \"pressure\" and low abdominal pain, nausea. She had a fever a week ago but none since. Blood in urine 6 weeks ago but none since.   "

## 2020-08-02 ENCOUNTER — APPOINTMENT (OUTPATIENT)
Dept: ULTRASOUND IMAGING | Facility: CLINIC | Age: 30
End: 2020-08-02
Attending: EMERGENCY MEDICINE
Payer: COMMERCIAL

## 2020-08-02 ENCOUNTER — HOSPITAL ENCOUNTER (EMERGENCY)
Facility: CLINIC | Age: 30
Discharge: HOME | End: 2020-08-02
Attending: EMERGENCY MEDICINE | Admitting: EMERGENCY MEDICINE
Payer: COMMERCIAL

## 2020-08-02 VITALS
OXYGEN SATURATION: 99 % | RESPIRATION RATE: 18 BRPM | DIASTOLIC BLOOD PRESSURE: 60 MMHG | HEIGHT: 65 IN | WEIGHT: 137 LBS | BODY MASS INDEX: 22.82 KG/M2 | SYSTOLIC BLOOD PRESSURE: 116 MMHG | TEMPERATURE: 99.3 F

## 2020-08-02 DIAGNOSIS — O21.9 NAUSEA AND VOMITING IN PREGNANCY: ICD-10-CM

## 2020-08-02 LAB
ANION GAP SERPL CALCULATED.3IONS-SCNC: 2 MMOL/L (ref 3–14)
BUN SERPL-MCNC: 7 MG/DL (ref 7–30)
CALCIUM SERPL-MCNC: 8.5 MG/DL (ref 8.5–10.1)
CHLORIDE SERPL-SCNC: 105 MMOL/L (ref 94–109)
CO2 SERPL-SCNC: 27 MMOL/L (ref 20–32)
CREAT SERPL-MCNC: 0.52 MG/DL (ref 0.52–1.04)
ERYTHROCYTE [DISTWIDTH] IN BLOOD BY AUTOMATED COUNT: 13.5 % (ref 10–15)
GFR SERPL CREATININE-BSD FRML MDRD: >90 ML/MIN/{1.73_M2}
GLUCOSE SERPL-MCNC: 79 MG/DL (ref 70–99)
HCT VFR BLD AUTO: 36 % (ref 35–47)
HGB BLD-MCNC: 11.4 G/DL (ref 11.7–15.7)
MCH RBC QN AUTO: 29 PG (ref 26.5–33)
MCHC RBC AUTO-ENTMCNC: 31.7 G/DL (ref 31.5–36.5)
MCV RBC AUTO: 92 FL (ref 78–100)
PLATELET # BLD AUTO: 241 10E9/L (ref 150–450)
POTASSIUM SERPL-SCNC: 3.6 MMOL/L (ref 3.4–5.3)
RBC # BLD AUTO: 3.93 10E12/L (ref 3.8–5.2)
SODIUM SERPL-SCNC: 134 MMOL/L (ref 133–144)
WBC # BLD AUTO: 11 10E9/L (ref 4–11)

## 2020-08-02 PROCEDURE — 99285 EMERGENCY DEPT VISIT HI MDM: CPT | Mod: 25

## 2020-08-02 PROCEDURE — 96375 TX/PRO/DX INJ NEW DRUG ADDON: CPT

## 2020-08-02 PROCEDURE — 96376 TX/PRO/DX INJ SAME DRUG ADON: CPT

## 2020-08-02 PROCEDURE — 25000125 ZZHC RX 250: Performed by: EMERGENCY MEDICINE

## 2020-08-02 PROCEDURE — 25000128 H RX IP 250 OP 636: Performed by: EMERGENCY MEDICINE

## 2020-08-02 PROCEDURE — 76801 OB US < 14 WKS SINGLE FETUS: CPT

## 2020-08-02 PROCEDURE — 96374 THER/PROPH/DIAG INJ IV PUSH: CPT

## 2020-08-02 PROCEDURE — 25800030 ZZH RX IP 258 OP 636: Performed by: NURSE PRACTITIONER

## 2020-08-02 PROCEDURE — 25000132 ZZH RX MED GY IP 250 OP 250 PS 637: Performed by: EMERGENCY MEDICINE

## 2020-08-02 PROCEDURE — 96361 HYDRATE IV INFUSION ADD-ON: CPT

## 2020-08-02 PROCEDURE — 80048 BASIC METABOLIC PNL TOTAL CA: CPT | Performed by: NURSE PRACTITIONER

## 2020-08-02 PROCEDURE — 85027 COMPLETE CBC AUTOMATED: CPT | Performed by: NURSE PRACTITIONER

## 2020-08-02 RX ORDER — METOCLOPRAMIDE HYDROCHLORIDE 5 MG/ML
10 INJECTION INTRAMUSCULAR; INTRAVENOUS ONCE
Status: DISCONTINUED | OUTPATIENT
Start: 2020-08-02 | End: 2020-08-02

## 2020-08-02 RX ORDER — ONDANSETRON 2 MG/ML
4 INJECTION INTRAMUSCULAR; INTRAVENOUS EVERY 30 MIN PRN
Status: DISCONTINUED | OUTPATIENT
Start: 2020-08-02 | End: 2020-08-02

## 2020-08-02 RX ORDER — METOCLOPRAMIDE HYDROCHLORIDE 5 MG/ML
5 INJECTION INTRAMUSCULAR; INTRAVENOUS ONCE
Status: COMPLETED | OUTPATIENT
Start: 2020-08-02 | End: 2020-08-02

## 2020-08-02 RX ORDER — PYRIDOXINE HCL (VITAMIN B6) 25 MG
25 TABLET ORAL 3 TIMES DAILY PRN
Qty: 30 TABLET | Refills: 0 | Status: ON HOLD | OUTPATIENT
Start: 2020-08-02 | End: 2021-03-21

## 2020-08-02 RX ADMIN — ONDANSETRON 4 MG: 2 INJECTION INTRAMUSCULAR; INTRAVENOUS at 17:05

## 2020-08-02 RX ADMIN — ONDANSETRON 4 MG: 2 INJECTION INTRAMUSCULAR; INTRAVENOUS at 17:38

## 2020-08-02 RX ADMIN — METOCLOPRAMIDE HYDROCHLORIDE 5 MG: 5 INJECTION INTRAMUSCULAR; INTRAVENOUS at 19:20

## 2020-08-02 RX ADMIN — SODIUM CHLORIDE 1000 ML: 9 INJECTION, SOLUTION INTRAVENOUS at 17:05

## 2020-08-02 RX ADMIN — LIDOCAINE HYDROCHLORIDE 30 ML: 20 SOLUTION ORAL; TOPICAL at 19:40

## 2020-08-02 ASSESSMENT — ENCOUNTER SYMPTOMS
RHINORRHEA: 0
SHORTNESS OF BREATH: 0
FEVER: 0
DIZZINESS: 1
LIGHT-HEADEDNESS: 1
SORE THROAT: 0
VOMITING: 1
NAUSEA: 1

## 2020-08-02 ASSESSMENT — MIFFLIN-ST. JEOR: SCORE: 1347.31

## 2020-08-02 NOTE — ED TRIAGE NOTES
Vomiting with pregnancy. States has been worsening over the past week. Denies cough, fever, shortness of breath or other symptoms.

## 2020-08-02 NOTE — ED AVS SNAPSHOT
[facility] Emergency Department  [address]  [facility] [address]  Phone:  [phone]  Fax:  [phone]                                    Sathya Nelson   MRN: [id number]    Department:  [facility] Emergency Department   Date of Visit:  8/2/2020           After Visit Summary Signature Page    I have received my discharge instructions, and my questions have been answered. I have discussed any challenges I see with this plan with the nurse or doctor.    ..........................................................................................................................................  Patient/Patient Representative Signature      ..........................................................................................................................................  Patient Representative Print Name and Relationship to Patient    ..................................................               ................................................  Date                                   Time    ..........................................................................................................................................  Reviewed by Signature/Title    ...................................................              ..............................................  Date                                               Time          22EPIC Rev 08/18

## 2020-08-02 NOTE — ED PROVIDER NOTES
"  History     Chief Complaint:  Nausea & Vomiting    HPI   Sathya Nelson is a 29 year old female with pregnancy at 8 weeks gestation who presents with nausea vomiting in early pregnancy. The patient reports having vomiting and dizziness that began 2 weeks ago. She presented to [facility] 07/13/2020 and was seen by Giovanny Luis NP who confirmed pregnancy and gave a prescription for Zofran. She last took Zofran yesterday. She states that her vomiting has increased in the last week and feels as though she is going to pass out. She also reports having some spotting but is unsure if the bleeding is vaginal or rectal. The patient denies any fever or other sickness.The patient has OBGYN but has not yet visited.     Allergies:  No Known Drug Allergies    Medications:    Albuterol inhaler   Zofran   Prenatal     Past Medical History:    Fibromyalgia   muscle spasm   Depression   Anxiety   Panic disorder  Chronic back pain   H. Pylori infection   radial styloid tenosynovitis  Generalized headaches  TBI    Past Surgical History:    The patient does not have any pertinent past surgical history.    Family History:    Mother - diabetes, hypertension   Father - asthma     Social History:  Patient states lives and works in [place], works for [employer].  Marital Status:       Review of Systems   Constitutional: Negative for fever.   HENT: Negative for rhinorrhea and sore throat.    Respiratory: Negative for shortness of breath.    Gastrointestinal: Positive for nausea and vomiting.   Genitourinary: Positive for vaginal bleeding.   Neurological: Positive for dizziness and light-headedness.   All other systems reviewed and are negative.    Physical Exam     Patient Vitals for the past 24 hrs:   BP Temp Temp src Heart Rate Resp SpO2 Height Weight   08/02/20 1915 116/60 -- -- 88 18 99 % -- --   08/02/20 1453 105/68 99.3  F (37.4  C) Oral 85 16 98 % 1.651 m (5' 5\") 62.1 kg (137 lb)       Physical " Exam  General: Patient is alert and interactive when I enter the room  Head:  The scalp, face, and head appear normal  Eyes:  Conjunctivae are normal  ENT:    The nose is normal    Pinnae are normal    External acoustic canals are normal  Neck:  Trachea midline  CV:  Pulses are normal    Resp:  No respiratory distress   Abdomen:      Soft, non-tender, non-distended  Musc:  Normal muscular tone    No major joint effusions    No asymmetric leg swelling  Skin:  No rash or lesions noted  Neuro:  Speech is normal and fluent. Face is symmetric.     Moving all extremities well.   Psych: Awake. Alert.  Normal affect.  Appropriate interactions.    Emergency Department Course     Imaging:  Radiology findings were communicated with the patient who voiced understanding of the findings.    US OB <14 Weeks Single  1.  Single living intrauterine gestation at 6 7 weeks 2 days, EDC 03/19/2021.     Reading per radiology.    Laboratory:  Laboratory findings were communicated with the patient who voiced understanding of the findings.    CBC: WBC 11.0, HGB 11.4 (L),     BMP: Anion gap 2 (L) o/w WNL (Creatinine 0.52)    Interventions:  1705 NS, 1 L, IV   1705 Zofran, 4 mg, IV   1738 Zofran, 4 mg, IV   1920 Reglan, 5 mg, IV   1940 GI Cocktail, 30 mL, Oral     Emergency Department Course:     Nursing notes and vitals reviewed.    1655 I performed an exam of the patient as documented above.    1700 IV was inserted and blood was drawn for laboratory testing, results above.    1756 The patient was sent for a US while in the emergency department, results above.     2004 Findings and plan explained to the Patient. Patient discharged home with instructions regarding supportive care, medications, and reasons to return. The importance of close follow-up was reviewed. The patient was prescribed medications listed below.      Impression & Plan      Medical Decision Making:  Sathya Nelson is a 29 year old female who presents for evaluation  of nausea and vomiting.  She is currently pregnant.  The patient has a completely benign abdominal exam without rebound, guarding, or marked tenderness to palpation.  I doubt ectopic pregnancy based on ultrasound.   Feels much improved after interventions in ED.  See above for medications for home.  I believe all of her symptoms are at this point explained by the pregnancy and hyperemesis gravidarum. Patient wanted to go home and encouraged to follow-up with OB/GYN.     Diagnosis:    ICD-10-CM    1. Nausea and vomiting in pregnancy  O21.9      Disposition:   Patient is discharged home.     Discharge Medications:  START taking      Dose / Directions   doxylamine 25 MG Tabs tablet  Commonly known as:  UNISOM      Dose:  12.5 mg  Take 0.5 tablets (12.5 mg) by mouth daily as needed for other (nausea)  Quantity:  15 tablet  Refills:  0     pyridOXINE 25 MG tablet  Commonly known as:  VITAMIN B6      Dose:  25 mg  Take 1 tablet (25 mg) by mouth 3 times daily as needed (vomiting)  Quantity:  30 tablet  Refills:  0       Scribe Disclosure:  I, Liza Leary, am serving as a scribe at 4:59 PM on 8/2/2020 to document services personally performed by Ana Redd MD based on my observations and the provider's statements to me.    [facility] EMERGENCY DEPARTMENT       Ana Redd MD  08/03/20 5412

## 2020-09-03 LAB
HBV SURFACE AG SERPL QL IA: NEGATIVE
HIV 1+2 AB+HIV1 P24 AG SERPL QL IA: NEGATIVE
RUBELLA ABY IGG: NORMAL

## 2020-12-18 LAB
GLU GEST SCREEN 1HR 50G: 81
TREPONEMA ANTIBODIES: NON REACTIVE

## 2021-02-01 ENCOUNTER — HOSPITAL ENCOUNTER (OUTPATIENT)
Facility: CLINIC | Age: 31
Discharge: HOME | End: 2021-02-01
Attending: OBSTETRICS & GYNECOLOGY | Admitting: OBSTETRICS & GYNECOLOGY
Payer: COMMERCIAL

## 2021-02-01 VITALS — SYSTOLIC BLOOD PRESSURE: 106 MMHG | DIASTOLIC BLOOD PRESSURE: 55 MMHG | TEMPERATURE: 98 F

## 2021-02-01 PROBLEM — Z36.89 ENCOUNTER FOR TRIAGE IN PREGNANT PATIENT: Status: ACTIVE | Noted: 2021-02-01

## 2021-02-01 RX ORDER — METRONIDAZOLE 500 MG/1
500 TABLET ORAL 2 TIMES DAILY
Status: ON HOLD | COMMUNITY
End: 2021-03-21

## 2021-02-01 RX ORDER — CEPHALEXIN 500 MG/1
500 CAPSULE ORAL 2 TIMES DAILY
Status: ON HOLD | COMMUNITY
End: 2021-02-11

## 2021-02-01 RX ORDER — VITAMIN A ACETATE, .BETA.-CAROTENE, ASCORBIC ACID, CHOLECALCIFEROL, .ALPHA.-TOCOPHEROL ACETATE, DL-, THIAMINE MONONITRATE, RIBOFLAVIN, NIACINAMIDE, PYRIDOXINE HYDROCHLORIDE, FOLIC ACID, CYANOCOBALAMIN, CALCIUM CARBONATE, FERROUS FUMARATE, ZINC OXIDE, AND CUPRIC OXIDE 2000; 2000; 120; 400; 22; 1.84; 3; 20; 10; 1; 12; 200; 27; 25; 2 [IU]/1; [IU]/1; MG/1; [IU]/1; MG/1; MG/1; MG/1; MG/1; MG/1; MG/1; UG/1; MG/1; MG/1; MG/1; MG/1
1 TABLET ORAL DAILY
COMMUNITY

## 2021-02-01 NOTE — PROVIDER NOTIFICATION
02/01/21 0248   Provider Notification   Provider Name/Title Dr. Samayoa   Method of Notification Phone   Notification Reason Other (Comment)     OB called for SVE. External Os 1 cm, internal Os closed and thick.

## 2021-02-01 NOTE — PROVIDER NOTIFICATION
21 0235   Provider Notification   Provider Name/Title Dr. Samayoa   Method of Notification Phone   Request Evaluate - Remote   Notification Reason Patient Arrived;SVE;Pain;Uterine Activity;Other (Comment)     OB updated  34 weeks here for pain after falling in shower last night. Patient states she never fell on her abdomen. Patient also diagnosed with BV, UTI and yeast infection Friday and is taking medication for the infections. Patient feels lower abdominal cramping that is not painful. Category 1 tracing, occasional contractions noted, but unable to palpate. SVE in clinic was closed. Order received for SVE. Order received to monitor until 0300. Discharge order received as long as Cat 1 tracing.

## 2021-02-01 NOTE — PLAN OF CARE
Data: Patient assessed in the [facility] for uterine contractions, abdominal pain.  Cervical exam mid position, closed, thick and firm.  Membranes intact.  Contractions 4.5-10 minutes, cannot palpate.  Action:  Presumed adequate fetal oxygenation documented (see flow record). Discharge instructions reviewed.  Patient instructed to report change in fetal movement, vaginal leaking of fluid or bleeding, abdominal pain, or any concerns related to the pregnancy to her nurse/physician.    Response: Orders to discharge home per Devika Samayoa.  Patient verbalized understanding of education and verbalized agreement with plan. Discharged to home at 0300.

## 2021-02-01 NOTE — PLAN OF CARE
Data: Patient presented to [facility]: 2021  1:50 AM.  Reason for maternal/fetal assessment is uterine contractions, pelvic pain. Patient reports falling onto knees and then left sidein the shower around 2100 last night. Patient reports she did not fall on abdomen. Patient was diagnosed with BV, UTI and yeast infection on Friday and is currently taking medication for all 3 infections. Patient reports continued urinary urgency and lower abdominal cramping as well as vaginal discharge. Patient is a .  Prenatal record reviewed. Pregnancy has been uncomplicated..  Gestational Age 34w1d. VSS. Fetal movement present. Patient denies leaking of vaginal fluid/rupture of membranes, vaginal bleeding, abdominal pain, nausea, vomiting, headache, visual disturbances, epigastric or URQ pain, significant edema. Support person is present.   Action: Verbal consent for EFM. Triage assessment completed. Bill of rights reviewed.  Response: Patient verbalized agreement with plan. Will contact Dr Devika Samayoa with update and further orders.

## 2021-02-01 NOTE — DISCHARGE INSTRUCTIONS
Discharge Instruction for Undelivered Patients      You were seen for: Labor Assessment  We Consulted: Dr. Samayoa  You had (Test or Medicine): fetal monitoring, cervical exam     Diet:   Drink 8 to 12 glasses of liquids (milk, juice, water) every day.  You may eat meals and snacks.     Activity:  Call your doctor or nurse midwife if your baby is moving less than usual.     Call your provider if you notice:  Swelling in your face or increased swelling in your hands or legs.  Headaches that are not relieved by Tylenol (acetaminophen).  Changes in your vision (blurring: seeing spots or stars.)  Nausea (sick to your stomach) and vomiting (throwing up).   Weight gain of 5 pounds or more per week.  Heartburn that doesn't go away.  Signs of bladder infection: pain when you urinate (use the toilet), need to go more often and more urgently.  The bag of leonard (rupture of membranes) breaks, or you notice leaking in your underwear.  Bright red blood in your underwear.  Abdominal (lower belly) or stomach pain.  For first baby: Contractions (tightening) less than 5 minutes apart for one hour or more.  Second (plus) baby: Contractions (tightening) less than 10 minutes apart and getting stronger.  *If less than 34 weeks: Contractions (tightenings) more than 6 times in one hour.  Increase or change in vaginal discharge (note the color and amount)  Other:     Follow-up:  As scheduled in the clinic

## 2021-02-04 ENCOUNTER — HOSPITAL ENCOUNTER (OUTPATIENT)
Facility: CLINIC | Age: 31
Discharge: HOME | End: 2021-02-04
Attending: OBSTETRICS & GYNECOLOGY | Admitting: OBSTETRICS & GYNECOLOGY
Payer: COMMERCIAL

## 2021-02-04 VITALS — TEMPERATURE: 97 F | RESPIRATION RATE: 16 BRPM

## 2021-02-04 LAB
ALBUMIN UR-MCNC: NEGATIVE MG/DL
APPEARANCE UR: CLEAR
BACTERIA #/AREA URNS HPF: ABNORMAL /HPF
BILIRUB UR QL STRIP: NEGATIVE
COLOR UR AUTO: YELLOW
ERYTHROCYTE [DISTWIDTH] IN BLOOD BY AUTOMATED COUNT: 12.9 % (ref 10–15)
GLUCOSE UR STRIP-MCNC: NEGATIVE MG/DL
HCT VFR BLD AUTO: 32.2 % (ref 35–47)
HGB BLD-MCNC: 10.2 G/DL (ref 11.7–15.7)
HGB UR QL STRIP: NEGATIVE
KETONES UR STRIP-MCNC: NEGATIVE MG/DL
LEUKOCYTE ESTERASE UR QL STRIP: ABNORMAL
MCH RBC QN AUTO: 28.4 PG (ref 26.5–33)
MCHC RBC AUTO-ENTMCNC: 31.7 G/DL (ref 31.5–36.5)
MCV RBC AUTO: 90 FL (ref 78–100)
MUCOUS THREADS #/AREA URNS LPF: PRESENT /LPF
NITRATE UR QL: NEGATIVE
PH UR STRIP: 6 PH (ref 5–7)
PLATELET # BLD AUTO: 220 10E9/L (ref 150–450)
RBC # BLD AUTO: 3.59 10E12/L (ref 3.8–5.2)
RBC #/AREA URNS AUTO: 3 /HPF (ref 0–2)
SOURCE: ABNORMAL
SP GR UR STRIP: 1.02 (ref 1–1.03)
SQUAMOUS #/AREA URNS AUTO: 2 /HPF (ref 0–1)
UROBILINOGEN UR STRIP-MCNC: NORMAL MG/DL (ref 0–2)
WBC # BLD AUTO: 12.7 10E9/L (ref 4–11)
WBC #/AREA URNS AUTO: 11 /HPF (ref 0–5)

## 2021-02-04 PROCEDURE — 258N000003 HC RX IP 258 OP 636: Performed by: OBSTETRICS & GYNECOLOGY

## 2021-02-04 PROCEDURE — 250N000013 HC RX MED GY IP 250 OP 250 PS 637: Performed by: OBSTETRICS & GYNECOLOGY

## 2021-02-04 PROCEDURE — 81001 URINALYSIS AUTO W/SCOPE: CPT | Performed by: OBSTETRICS & GYNECOLOGY

## 2021-02-04 PROCEDURE — 85027 COMPLETE CBC AUTOMATED: CPT | Performed by: OBSTETRICS & GYNECOLOGY

## 2021-02-04 PROCEDURE — 96365 THER/PROPH/DIAG IV INF INIT: CPT

## 2021-02-04 PROCEDURE — G0463 HOSPITAL OUTPT CLINIC VISIT: HCPCS

## 2021-02-04 PROCEDURE — 87086 URINE CULTURE/COLONY COUNT: CPT | Performed by: OBSTETRICS & GYNECOLOGY

## 2021-02-04 RX ORDER — ACETAMINOPHEN 325 MG/1
975 TABLET ORAL EVERY 4 HOURS PRN
Status: DISCONTINUED | OUTPATIENT
Start: 2021-02-04 | End: 2021-02-04 | Stop reason: HOSPADM

## 2021-02-04 RX ORDER — DIPHENHYDRAMINE HCL 25 MG
25 CAPSULE ORAL EVERY 6 HOURS PRN
Status: DISCONTINUED | OUTPATIENT
Start: 2021-02-04 | End: 2021-02-04 | Stop reason: HOSPADM

## 2021-02-04 RX ADMIN — ACETAMINOPHEN 975 MG: 325 TABLET, FILM COATED ORAL at 16:34

## 2021-02-04 RX ADMIN — DIPHENHYDRAMINE HYDROCHLORIDE 25 MG: 25 CAPSULE ORAL at 17:29

## 2021-02-04 RX ADMIN — SODIUM CHLORIDE, POTASSIUM CHLORIDE, SODIUM LACTATE AND CALCIUM CHLORIDE 1000 ML: 600; 310; 30; 20 INJECTION, SOLUTION INTRAVENOUS at 16:45

## 2021-02-04 NOTE — DISCHARGE INSTRUCTIONS
Discharge Instruction for Undelivered Patients      You were seen for: Labor Assessment  We Consulted: Dr. Casiano  You had (Test or Medicine):Fetal and uterine monitoring, cervical exam, urine assessment, IV fluids, tylenol and benadryl.     Diet:   Drink 8 to 12 glasses of liquids (milk, juice, water) every day.  You may eat meals and snacks.     Activity:  Count fetal kicks everyday (see handout)  Call your doctor or nurse midwife if your baby is moving less than usual.     Call your provider if you notice:  Swelling in your face or increased swelling in your hands or legs.  Headaches that are not relieved by Tylenol (acetaminophen).  Changes in your vision (blurring: seeing spots or stars.)  Nausea (sick to your stomach) and vomiting (throwing up).   Weight gain of 5 pounds or more per week.  Heartburn that doesn't go away.  Signs of bladder infection: pain when you urinate (use the toilet), need to go more often and more urgently.  The bag of leonard (rupture of membranes) breaks, or you notice leaking in your underwear.  Bright red blood in your underwear.  Abdominal (lower belly) or stomach pain.  For first baby: Contractions (tightening) less than 5 minutes apart for one hour or more.  *If less than 34 weeks: Contractions (tightenings) more than 6 times in one hour.  Increase or change in vaginal discharge (note the color and amount)      Follow-up:  As scheduled in the clinic

## 2021-02-04 NOTE — PLAN OF CARE
MD updated regarding patient status. Patient still complaining of discomfort despite tylenol, benadryl, IV fluids. Per MD labs show no more sign of infection, patient pain likely due to recovery from infections. SVE unchanged, cervix remains closed. Irritability remains but no apparent signs of PTL at this time. FHT's category 1.    Per MD, ok to discharge to home with self care instructions for discomfort/pain. Ok for patient to have doctors note to be excused from work on this day.

## 2021-02-04 NOTE — PLAN OF CARE
Data: Patient presented to [facility]: 2021  4:06 PM.  Reason for maternal/fetal assessment is uterine contractions, abdominal pain. Patient reports cramping/pain that has been coming and going for the past few days and is now becoming much more intense over the past 2 hours. Patient is rating her pain a 7/8 on a scale of 0-10 and is visibly uncomfortable. Patient is currently being treated for a UTI, BV, and yeast, of note that last urine assessment was not sent to culture.  Patient is a .  Prenatal record reviewed. Pregnancy has been uncomplicated..  Gestational Age 34w3d. VSS. Fetal movement present. FHT's cat 1. UC's not present on monitor, irritability noted palpating as cramps/mild. SVE 0/80/0. Patient denies leaking of vaginal fluid/rupture of membranes, vaginal bleeding, nausea, vomiting, headache, visual disturbances, epigastric or URQ pain, significant edema. Support person is not present.   Action: Verbal consent for EFM. Triage assessment completed. Bill of rights reviewed.  Response: Patient verbalized agreement with plan. Dr. Casiano updated regarding patient arrival and assessment data. Orders for UA, with culture, 975 tylenol, CBC, and IV fluid bolus.

## 2021-02-05 LAB
BACTERIA SPEC CULT: NO GROWTH
Lab: NORMAL
SPECIMEN SOURCE: NORMAL

## 2021-02-11 ENCOUNTER — HOSPITAL ENCOUNTER (OUTPATIENT)
Facility: CLINIC | Age: 31
End: 2021-02-11
Admitting: OBSTETRICS & GYNECOLOGY
Payer: COMMERCIAL

## 2021-02-11 ENCOUNTER — HOSPITAL ENCOUNTER (OUTPATIENT)
Facility: CLINIC | Age: 31
Discharge: HOME | End: 2021-02-11
Attending: OBSTETRICS & GYNECOLOGY | Admitting: OBSTETRICS & GYNECOLOGY
Payer: COMMERCIAL

## 2021-02-11 VITALS — SYSTOLIC BLOOD PRESSURE: 113 MMHG | RESPIRATION RATE: 16 BRPM | DIASTOLIC BLOOD PRESSURE: 56 MMHG | TEMPERATURE: 97.7 F

## 2021-02-11 LAB
ALBUMIN UR-MCNC: 20 MG/DL
APPEARANCE UR: ABNORMAL
BILIRUB UR QL STRIP: NEGATIVE
COLOR UR AUTO: YELLOW
GLUCOSE UR STRIP-MCNC: NEGATIVE MG/DL
HGB UR QL STRIP: NEGATIVE
KETONES UR STRIP-MCNC: NEGATIVE MG/DL
LEUKOCYTE ESTERASE UR QL STRIP: ABNORMAL
MUCOUS THREADS #/AREA URNS LPF: PRESENT /LPF
NITRATE UR QL: NEGATIVE
PH UR STRIP: 6.5 PH (ref 5–7)
RBC #/AREA URNS AUTO: 2 /HPF (ref 0–2)
SOURCE: ABNORMAL
SP GR UR STRIP: 1.02 (ref 1–1.03)
SQUAMOUS #/AREA URNS AUTO: 7 /HPF (ref 0–1)
UROBILINOGEN UR STRIP-MCNC: NORMAL MG/DL (ref 0–2)
WBC #/AREA URNS AUTO: 22 /HPF (ref 0–5)

## 2021-02-11 PROCEDURE — G0463 HOSPITAL OUTPT CLINIC VISIT: HCPCS | Mod: 25

## 2021-02-11 PROCEDURE — 81001 URINALYSIS AUTO W/SCOPE: CPT | Performed by: OBSTETRICS & GYNECOLOGY

## 2021-02-11 PROCEDURE — 59025 FETAL NON-STRESS TEST: CPT

## 2021-02-11 PROCEDURE — 87086 URINE CULTURE/COLONY COUNT: CPT | Performed by: OBSTETRICS & GYNECOLOGY

## 2021-02-11 NOTE — PROVIDER NOTIFICATION
02/11/21 0950   Provider Notification   Provider Name/Title Dr Aviles   Method of Notification Phone   Request Evaluate - Remote   Notification Reason Patient Arrived     MD updated on pt arrival. Reviewed FHR tracing. Notified that pt has been feeling pain and discomfort in her lower back for the past few weeks and is still noticing it as well as some pelvic pressure. Notified that UA is pending. Orders to check SVE and call MD when results are back from UA

## 2021-02-11 NOTE — PROVIDER NOTIFICATION
02/11/21 1010   Provider Notification   Provider Name/Title Dr Adams   Method of Notification In Department   Request Evaluate - Remote   Notification Reason Lab/Diagnostic Study     MD in department. Reviewed FHR tracing and UA results. Orders to discharge pt home and follow up at appointment today at 1500. NST signed.

## 2021-02-11 NOTE — PLAN OF CARE
Data: Patient presented to [facility]: 2021  9:20 AM.  Reason for maternal/fetal assessment is uterine contractions, abdominal pain, pelvic pain. Patient reports that she has been having back pain, abdominal pain and pelvic pressure for the last few weeks and is still noticing it. Has not noticed fluid leaking just some mucous discharge occasionally.  Patient is a .  Prenatal record reviewed. Pregnancy has been uncomplicated..  Gestational Age 35w3d. VSS. Fetal movement present. Patient denies leaking of vaginal fluid/rupture of membranes, vaginal bleeding, nausea, vomiting, headache, visual disturbances, epigastric or URQ pain, significant edema. Support person is not present.   Action: Verbal consent for EFM. Triage assessment completed. Bill of rights reviewed.  Response: Patient verbalized agreement with plan. Will contact Dr Katherin Adams with update and further orders.

## 2021-02-11 NOTE — DISCHARGE INSTRUCTIONS
Discharge Instruction for Undelivered Patients      You were seen for: Labor Assessment  We Consulted: Dr Adams  You had (Test or Medicine):NST, UA     Diet:   Drink 8 to 12 glasses of liquids (milk, juice, water) every day.     Activity:  Call your doctor or nurse midwife if your baby is moving less than usual.     Call your provider if you notice:  Swelling in your face or increased swelling in your hands or legs.  Headaches that are not relieved by Tylenol (acetaminophen).  Changes in your vision (blurring: seeing spots or stars.)  Nausea (sick to your stomach) and vomiting (throwing up).   Weight gain of 5 pounds or more per week.  Heartburn that doesn't go away.  Signs of bladder infection: pain when you urinate (use the toilet), need to go more often and more urgently.  The bag of leonard (rupture of membranes) breaks, or you notice leaking in your underwear.  Bright red blood in your underwear.  Abdominal (lower belly) or stomach pain.  For first baby: Contractions (tightening) less than 5 minutes apart for one hour or more.  Second (plus) baby: Contractions (tightening) less than 10 minutes apart and getting stronger.  *If less than 34 weeks: Contractions (tightenings) more than 6 times in one hour.  Increase or change in vaginal discharge (note the color and amount)    Follow-up:  As scheduled in the clinic

## 2021-02-11 NOTE — PLAN OF CARE
Data: Patient assessed in the [facility] for uterine contractions, abdominal pain, pelvic pain.  Cervical exam closed, effaced >70% and soft.  Membranes intact.  Contractions not noted or palpated.  Action:  Presumed adequate fetal oxygenation documented (see flow record). Discharge instructions reviewed.  Patient instructed to report change in fetal movement, vaginal leaking of fluid or bleeding, abdominal pain, or any concerns related to the pregnancy to her nurse/physician.    Response: Orders to discharge home per Katherin Adams.  Patient verbalized understanding of education and verbalized agreement with plan. Discharged to home at 1022.

## 2021-02-12 LAB
BACTERIA SPEC CULT: NO GROWTH
Lab: NORMAL
SPECIMEN SOURCE: NORMAL

## 2021-02-18 LAB — GROUP B STREP PCR: NEGATIVE

## 2021-02-23 ENCOUNTER — HOSPITAL ENCOUNTER (OUTPATIENT)
Facility: CLINIC | Age: 31
Discharge: HOME | End: 2021-02-23
Attending: OBSTETRICS & GYNECOLOGY | Admitting: OBSTETRICS & GYNECOLOGY
Payer: COMMERCIAL

## 2021-02-23 VITALS
WEIGHT: 166 LBS | TEMPERATURE: 97.5 F | SYSTOLIC BLOOD PRESSURE: 105 MMHG | RESPIRATION RATE: 18 BRPM | DIASTOLIC BLOOD PRESSURE: 63 MMHG | HEIGHT: 65 IN | BODY MASS INDEX: 27.66 KG/M2

## 2021-02-23 LAB
ALBUMIN UR-MCNC: 50 MG/DL
APPEARANCE UR: ABNORMAL
BACTERIA #/AREA URNS HPF: ABNORMAL /HPF
BILIRUB UR QL STRIP: NEGATIVE
COLOR UR AUTO: YELLOW
CREAT UR-MCNC: 258 MG/DL
GLUCOSE UR STRIP-MCNC: NEGATIVE MG/DL
HGB UR QL STRIP: ABNORMAL
KETONES UR STRIP-MCNC: NEGATIVE MG/DL
LEUKOCYTE ESTERASE UR QL STRIP: ABNORMAL
MUCOUS THREADS #/AREA URNS LPF: PRESENT /LPF
NITRATE UR QL: NEGATIVE
PH UR STRIP: 6 PH (ref 5–7)
PROT UR-MCNC: 0.48 G/L
PROT/CREAT 24H UR: 0.19 G/G CR (ref 0–0.2)
RBC #/AREA URNS AUTO: 6 /HPF (ref 0–2)
SOURCE: ABNORMAL
SP GR UR STRIP: 1.03 (ref 1–1.03)
SQUAMOUS #/AREA URNS AUTO: 17 /HPF (ref 0–1)
UROBILINOGEN UR STRIP-MCNC: NORMAL MG/DL (ref 0–2)
WBC #/AREA URNS AUTO: 70 /HPF (ref 0–5)

## 2021-02-23 PROCEDURE — 81001 URINALYSIS AUTO W/SCOPE: CPT | Performed by: OBSTETRICS & GYNECOLOGY

## 2021-02-23 PROCEDURE — G0463 HOSPITAL OUTPT CLINIC VISIT: HCPCS | Mod: 25

## 2021-02-23 PROCEDURE — 59025 FETAL NON-STRESS TEST: CPT

## 2021-02-23 PROCEDURE — 87086 URINE CULTURE/COLONY COUNT: CPT | Performed by: OBSTETRICS & GYNECOLOGY

## 2021-02-23 PROCEDURE — 84156 ASSAY OF PROTEIN URINE: CPT | Performed by: OBSTETRICS & GYNECOLOGY

## 2021-02-23 RX ORDER — ONDANSETRON 2 MG/ML
4 INJECTION INTRAMUSCULAR; INTRAVENOUS EVERY 6 HOURS PRN
Status: DISCONTINUED | OUTPATIENT
Start: 2021-02-23 | End: 2021-02-23 | Stop reason: HOSPADM

## 2021-02-23 ASSESSMENT — MIFFLIN-ST. JEOR: SCORE: 1473.85

## 2021-02-23 NOTE — PLAN OF CARE
Data: Patient presented to [facility]: 2021  7:42 AM.  Reason for maternal/fetal assessment is abdominal pain, pelvic pain. Patient reports that for the past few days she has had lower back pain and today she has constant sharp pelvic pain, where she can't get comfortable. She also states that she lost her mucous plug and noticed a drop of blood in the toilet after urination today. She also states that she has blurred vision today.  Patient is a .  Prenatal record reviewed. Pregnancy has been uncomplicated..  Gestational Age 37w1d. VSS. Fetal movement present. Patient denies leaking of vaginal fluid/rupture of membranes, nausea, vomiting, headache, epigastric or URQ pain, significant edema. Support person is present.   Action: Verbal consent for EFM. Triage assessment completed. Bill of rights reviewed.  Response: Patient verbalized agreement with plan. Will contact Dr Trupti Hopkins with update and further orders.

## 2021-02-23 NOTE — PROVIDER NOTIFICATION
02/23/21 0940   Provider Notification   Provider Name/Title Dr Hopkins   Method of Notification Phone   Request Evaluate - Remote   Notification Reason Lab/Diagnostic Study;SIGRIDE   Updated MD of cheryl 1/60/-1, unchanged from last week, urine protein creatinine ratio WDL, and reviewed UA results with MD. MD gave TORB to write pt prescription for Macrobid 100mg tabs BID x 7days and to discharge pt to home.

## 2021-02-23 NOTE — PLAN OF CARE
Data: Patient assessed in the [facility] for abdominal pain, pelvic pain.  Cervical exam 1/60/-1.  Membranes intact.  Contractions irregular.  Action:  Presumed adequate fetal oxygenation documented (see flow record). Discharge instructions reviewed.  Patient instructed to report change in fetal movement, vaginal leaking of fluid or bleeding, abdominal pain, or any concerns related to the pregnancy to her nurse/physician.    Response: Orders to discharge home per .  Patient verbalized understanding of education and verbalized agreement with plan. Discharged to home at 0952.

## 2021-02-23 NOTE — DISCHARGE INSTRUCTIONS
Discharge Instruction for Undelivered Patients      You were seen for: Labor Assessment  We Consulted: Dr Trupti Hopkins  You had (Test or Medicine):Urine analysis, cervical-vaginal exam, fetal and uterine monitoring     Diet:   Drink 8 to 12 glasses of liquids (milk, juice, water) every day.  You may eat meals and snacks.     Activity:  Count fetal kicks everyday (see handout)  Call your doctor or nurse midwife if your baby is moving less than usual.     Call your provider if you notice:  Swelling in your face or increased swelling in your hands or legs.  Headaches that are not relieved by Tylenol (acetaminophen).  Changes in your vision (blurring: seeing spots or stars.)  Nausea (sick to your stomach) and vomiting (throwing up).   Weight gain of 5 pounds or more per week.  Heartburn that doesn't go away.  Signs of bladder infection: pain when you urinate (use the toilet), need to go more often and more urgently.  The bag of leonard (rupture of membranes) breaks, or you notice leaking in your underwear.  Bright red blood in your underwear.  Abdominal (lower belly) or stomach pain.  For first baby: Contractions (tightening) less than 5 minutes apart for one hour or more.  Increase or change in vaginal discharge (note the color and amount)    Follow-up:  As scheduled in the clinic     *Fill and take prescription for Macrobid 100mg tablets, twice a day for a total of 7 days.

## 2021-02-23 NOTE — PROVIDER NOTIFICATION
"   21 0829   Provider Notification   Provider Name/Title Dr Hopkins   Method of Notification Phone   Request Evaluate - Remote   Notification Reason Patient Arrived   Updated MD of pt's arrival,  37.1 here with the complaint of lower back pain for the past few days, lower pelvis pain described as \"sharp\" and blurry vision. Her BP is 106/63 and she doesn't have any other pre-e complaints other than the blurry vision. Pt states that she \"lost her mucus plug\" and noted a drop of blood in urine today. SVE last week was 1cm. Irregular contractions noted on the monitor and Cat. 1 FHTS noted. MD gave TORB for SVE and to send UA and check protein in urine.   "

## 2021-02-24 LAB
BACTERIA SPEC CULT: NORMAL
Lab: NORMAL
SPECIMEN SOURCE: NORMAL

## 2021-02-25 ENCOUNTER — HOSPITAL ENCOUNTER (OUTPATIENT)
Facility: CLINIC | Age: 31
Discharge: HOME | End: 2021-02-25
Attending: OBSTETRICS & GYNECOLOGY | Admitting: OBSTETRICS & GYNECOLOGY
Payer: COMMERCIAL

## 2021-02-25 VITALS — SYSTOLIC BLOOD PRESSURE: 120 MMHG | TEMPERATURE: 97.9 F | RESPIRATION RATE: 18 BRPM | DIASTOLIC BLOOD PRESSURE: 58 MMHG

## 2021-02-25 PROCEDURE — G0463 HOSPITAL OUTPT CLINIC VISIT: HCPCS | Mod: 25

## 2021-02-25 PROCEDURE — 59025 FETAL NON-STRESS TEST: CPT

## 2021-02-26 NOTE — DISCHARGE INSTRUCTIONS
Discharge Instruction for Undelivered Patients      You were seen for: cramping  We Consulted: Dr. Childress  You had (Test or Medicine):non stress test     Diet:   Drink 8 to 12 glasses of liquids (milk, juice, water) every day.     Activity:  Call your doctor or nurse midwife if your baby is moving less than usual.     Call your provider if you notice:  Swelling in your face or increased swelling in your hands or legs.  Headaches that are not relieved by Tylenol (acetaminophen).  Changes in your vision (blurring: seeing spots or stars.)  Nausea (sick to your stomach) and vomiting (throwing up).   Weight gain of 5 pounds or more per week.  Heartburn that doesn't go away.  Signs of bladder infection: pain when you urinate (use the toilet), need to go more often and more urgently.  The bag of leonard (rupture of membranes) breaks, or you notice leaking in your underwear.  Bright red blood in your underwear.  Abdominal (lower belly) or stomach pain.  Contractions (tightening) less than 5 minutes apart for one hour or more.  Increase or change in vaginal discharge (note the color and amount)    Follow-up:  As scheduled in the clinic

## 2021-02-26 NOTE — PLAN OF CARE
Data: Patient assessed in the [facility] for cramping.  Cervical exam unchanged.  Membranes intact.  Contractions mild and irreular.  Action:  Presumed adequate fetal oxygenation documented (see flow record). Discharge instructions reviewed.  Patient instructed to report change in fetal movement, vaginal leaking of fluid or bleeding, abdominal pain, or any concerns related to the pregnancy to her nurse/physician.    Response: Orders to discharge home per Ana Laura Childress.  Patient verbalized understanding of education and verbalized agreement with plan. Discharged to home at 1820.

## 2021-02-26 NOTE — PROVIDER NOTIFICATION
02/25/21 1805   Provider Notification   Provider Name/Title Dr. Childress   Method of Notification Phone   Request Evaluate - Remote   Notification Reason SVE     Verbal orders received to discharge if NST reactive.  Discontinue antibiotics as urine culture was negative.  Patient updated on plan.

## 2021-02-26 NOTE — PLAN OF CARE
Data: Patient presented to [facility]: 2021  5:36 PM.  Reason for maternal/fetal assessment is cramping. Patient reports abdominal cramping today.  Reports bloody show that occurred at 1545 today, has not had any further bleeding.  Patient is a .  Prenatal record reviewed. Pregnancy has been uncomplicated..  Gestational Age 37w3d. VSS. Fetal movement present. Patient denies uterine contractions, leaking of vaginal fluid/rupture of membranes, vaginal bleeding. Support person is present.   Action: Verbal consent for EFM. Triage assessment completed. Bill of rights reviewed.  Response: Patient verbalized agreement with plan. Will contact Dr Ana Laura Childress with update and further orders.

## 2021-02-28 ENCOUNTER — HOSPITAL ENCOUNTER (OUTPATIENT)
Facility: CLINIC | Age: 31
Discharge: HOME | End: 2021-03-01
Attending: OBSTETRICS & GYNECOLOGY | Admitting: OBSTETRICS & GYNECOLOGY
Payer: COMMERCIAL

## 2021-02-28 VITALS — SYSTOLIC BLOOD PRESSURE: 92 MMHG | DIASTOLIC BLOOD PRESSURE: 59 MMHG | RESPIRATION RATE: 20 BRPM | TEMPERATURE: 98 F

## 2021-02-28 LAB
ALBUMIN UR-MCNC: 30 MG/DL
APPEARANCE UR: CLEAR
BACTERIA #/AREA URNS HPF: ABNORMAL /HPF
BILIRUB UR QL STRIP: NEGATIVE
CAOX CRY #/AREA URNS HPF: ABNORMAL /HPF
COLOR UR AUTO: YELLOW
GLUCOSE UR STRIP-MCNC: NEGATIVE MG/DL
HGB UR QL STRIP: NEGATIVE
KETONES UR STRIP-MCNC: ABNORMAL MG/DL
LEUKOCYTE ESTERASE UR QL STRIP: ABNORMAL
MUCOUS THREADS #/AREA URNS LPF: PRESENT /LPF
NITRATE UR QL: NEGATIVE
PH UR STRIP: 6 PH (ref 5–7)
RBC #/AREA URNS AUTO: 2 /HPF (ref 0–2)
RUPTURE OF FETAL MEMBRANES BY ROM PLUS: NEGATIVE
SOURCE: ABNORMAL
SP GR UR STRIP: 1.03 (ref 1–1.03)
SPECIMEN SOURCE: ABNORMAL
SQUAMOUS #/AREA URNS AUTO: 3 /HPF (ref 0–1)
UROBILINOGEN UR STRIP-MCNC: NORMAL MG/DL (ref 0–2)
WBC #/AREA URNS AUTO: 16 /HPF (ref 0–5)
WET PREP SPEC: ABNORMAL

## 2021-02-28 PROCEDURE — 250N000011 HC RX IP 250 OP 636: Performed by: OBSTETRICS & GYNECOLOGY

## 2021-02-28 PROCEDURE — 84112 EVAL AMNIOTIC FLUID PROTEIN: CPT | Performed by: OBSTETRICS & GYNECOLOGY

## 2021-02-28 PROCEDURE — 81001 URINALYSIS AUTO W/SCOPE: CPT | Performed by: OBSTETRICS & GYNECOLOGY

## 2021-02-28 PROCEDURE — 87210 SMEAR WET MOUNT SALINE/INK: CPT | Performed by: OBSTETRICS & GYNECOLOGY

## 2021-02-28 PROCEDURE — 258N000003 HC RX IP 258 OP 636: Performed by: OBSTETRICS & GYNECOLOGY

## 2021-02-28 PROCEDURE — 250N000013 HC RX MED GY IP 250 OP 250 PS 637: Performed by: OBSTETRICS & GYNECOLOGY

## 2021-02-28 PROCEDURE — 87086 URINE CULTURE/COLONY COUNT: CPT | Performed by: OBSTETRICS & GYNECOLOGY

## 2021-02-28 RX ORDER — NALOXONE HYDROCHLORIDE 0.4 MG/ML
0.4 INJECTION, SOLUTION INTRAMUSCULAR; INTRAVENOUS; SUBCUTANEOUS
Status: DISCONTINUED | OUTPATIENT
Start: 2021-02-28 | End: 2021-03-01 | Stop reason: HOSPADM

## 2021-02-28 RX ORDER — OXYCODONE HYDROCHLORIDE 5 MG/1
5 TABLET ORAL EVERY 4 HOURS PRN
Status: DISCONTINUED | OUTPATIENT
Start: 2021-02-28 | End: 2021-03-01 | Stop reason: HOSPADM

## 2021-02-28 RX ORDER — ONDANSETRON 2 MG/ML
4 INJECTION INTRAMUSCULAR; INTRAVENOUS EVERY 6 HOURS PRN
Status: DISCONTINUED | OUTPATIENT
Start: 2021-02-28 | End: 2021-03-01 | Stop reason: HOSPADM

## 2021-02-28 RX ORDER — FLUCONAZOLE 150 MG/1
150 TABLET ORAL ONCE
Status: COMPLETED | OUTPATIENT
Start: 2021-02-28 | End: 2021-02-28

## 2021-02-28 RX ORDER — NALOXONE HYDROCHLORIDE 0.4 MG/ML
0.2 INJECTION, SOLUTION INTRAMUSCULAR; INTRAVENOUS; SUBCUTANEOUS
Status: DISCONTINUED | OUTPATIENT
Start: 2021-02-28 | End: 2021-03-01 | Stop reason: HOSPADM

## 2021-02-28 RX ADMIN — OXYCODONE HYDROCHLORIDE 5 MG: 5 TABLET ORAL at 23:25

## 2021-02-28 RX ADMIN — SODIUM CHLORIDE, POTASSIUM CHLORIDE, SODIUM LACTATE AND CALCIUM CHLORIDE 500 ML: 600; 310; 30; 20 INJECTION, SOLUTION INTRAVENOUS at 22:20

## 2021-02-28 RX ADMIN — ONDANSETRON 4 MG: 2 INJECTION INTRAMUSCULAR; INTRAVENOUS at 22:21

## 2021-02-28 RX ADMIN — FLUCONAZOLE 150 MG: 150 TABLET ORAL at 23:06

## 2021-03-01 ENCOUNTER — APPOINTMENT (OUTPATIENT)
Dept: ULTRASOUND IMAGING | Facility: CLINIC | Age: 31
End: 2021-03-01
Attending: OBSTETRICS & GYNECOLOGY
Payer: COMMERCIAL

## 2021-03-01 LAB
BACTERIA SPEC CULT: NORMAL
Lab: NORMAL
SPECIMEN SOURCE: NORMAL

## 2021-03-01 PROCEDURE — 76775 US EXAM ABDO BACK WALL LIM: CPT

## 2021-03-01 PROCEDURE — G0463 HOSPITAL OUTPT CLINIC VISIT: HCPCS | Mod: 25

## 2021-03-01 NOTE — PROVIDER NOTIFICATION
02/28/21 2152   Provider Notification   Provider Name/Title Dr. Slaughter   Method of Notification Phone   Request Evaluate - Remote   Notification Reason Patient Arrived;Pain;SVE   Notified MD that patient arrived to triage c/o diarrhea, N/V, lower back pain, pelvic/vaginal pain, vaginal discharge, leg pain, vaginal itching, UTI symptoms, and wetness in underwear for last few days. SVE 1-2/70/-1, EFM shows moderate variability w accels, no decels. TO to administer IV bolus 500 ml LR, PRN Zofran 4mg IV, obtain UA, Wet prep, and ROM plus. Monitoring can be discontinued after obtaining a 20 min reactive strip. See orders. Will contact MD with lab results.

## 2021-03-01 NOTE — PROVIDER NOTIFICATION
02/28/21 1600   Provider Notification   Provider Name/Title Dr. Slaughter   Method of Notification Phone   Request Evaluate - Remote   Notification Reason Pain   Updated MD that patient pain level remains moderate-severe, now localized to R flank and painful urination. Verbal order for bilateral renal US to r/o kidney stones and 5mg oxycodone PO. See orders.

## 2021-03-01 NOTE — PLAN OF CARE
Patient back in room, prefers to sit up in chair. Reports pain has decreased since taking oxycodone, however still very uncomfortable. Declines need for warm pack, or other interventions at this time.

## 2021-03-01 NOTE — PLAN OF CARE
[facility]  Reason for maternal/fetal assessment per patient is diarrhea, lower back pain, vaginal pain, vaginal discharge, nausea/vomiting. Patient reports Diagnosed w/ UTI 5 days ago, no longer taking Keflex as it has made her feel sick, now presents with multiple symptoms and is in moderate to severe pain.  Patient is a .  Prenatal record reviewed. Pregnancy has been complicated by nausea, urinary tract infections, and BV thus far.  Gestational Age 37 weeks 6 days. VSS. Fetal movement present. Patient denies bloody show, vaginal bleeding, edema, headache, visual disturbances, epigastric or URQ pain, abdominal pain. Support person is present.   Action: Verbal consent for EFM. Triage assessment completed. Bill of rights reviewed.    Response: Patient verbalized agreement with plan. oriented to room and call light.  Will contact MD for plan.

## 2021-03-01 NOTE — DISCHARGE INSTRUCTIONS
Discharge Instruction for Undelivered Patients      You were seen for: Labor Assessment and Membrane Assessment, Pain assessment  We Consulted: Dr. Elena Slaughter  You had (Test or Medicine):electronic fetal monitoring, urinalysis, Wet Prep, ROM Plus, cervical exam, Renal ultrasound, 5 mg oxycodone, 500mL IV fluid, 150 mg Diflucan, 4 mg IV Zofran     Diet:   Drink 8 to 12 glasses of liquids (milk, juice, water) every day.  You may eat meals and snacks.     Activity:  Count fetal kicks everyday (see handout)  Call your doctor or nurse midwife if your baby is moving less than usual.     Call your provider if you notice:  Swelling in your face or increased swelling in your hands or legs.  Headaches that are not relieved by Tylenol (acetaminophen).  Changes in your vision (blurring: seeing spots or stars.)  Nausea (sick to your stomach) and vomiting (throwing up).   Weight gain of 5 pounds or more per week.  Heartburn that doesn't go away.  Signs of bladder infection: pain when you urinate (use the toilet), need to go more often and more urgently.  The bag of leonard (rupture of membranes) breaks, or you notice leaking in your underwear.  Bright red blood in your underwear.  Abdominal (lower belly) or stomach pain.  For first baby: Contractions (tightening) less than 5 minutes apart for one hour or more.  Increase or change in vaginal discharge (note the color and amount)      Follow-up:  As scheduled in the clinic    Stay hydrated!

## 2021-03-01 NOTE — PROVIDER NOTIFICATION
02/28/21 2245   Provider Notification   Provider Name/Title Dr. Slaughter   Method of Notification Phone   Request Evaluate - Remote   Notification Reason Lab/Diagnostic Study;Status Update   Updated MD on lab results, and patient's pain has decreased with zofran and IV bolus. TO for Diflucan PO 150mg x1 now for yeast seen on wet prep. MD OK for patient to discharge home after IV bolus. See orders.

## 2021-03-01 NOTE — PLAN OF CARE
Presumed adequate fetal oxygenation documented (see flow record). Discharge instructions reviewed.  Patient instructed to report change in fetal movement, vaginal leaking of fluid or bleeding, abdominal pain, or any concerns related to the pregnancy to her nurse/physician. Orders to discharge home per Elena Slaughter.  Patient verbalized understanding of education and verbalized agreement with plan. Discharged to home at 0140.

## 2021-03-07 ENCOUNTER — HOSPITAL ENCOUNTER (OUTPATIENT)
Facility: CLINIC | Age: 31
Discharge: HOME | End: 2021-03-07
Attending: OBSTETRICS & GYNECOLOGY | Admitting: OBSTETRICS & GYNECOLOGY
Payer: COMMERCIAL

## 2021-03-07 VITALS
SYSTOLIC BLOOD PRESSURE: 110 MMHG | TEMPERATURE: 98.1 F | HEART RATE: 88 BPM | RESPIRATION RATE: 18 BRPM | DIASTOLIC BLOOD PRESSURE: 62 MMHG

## 2021-03-07 LAB
ALBUMIN UR-MCNC: 20 MG/DL
APPEARANCE UR: ABNORMAL
BILIRUB UR QL STRIP: NEGATIVE
COLOR UR AUTO: YELLOW
GLUCOSE UR STRIP-MCNC: NEGATIVE MG/DL
HGB UR QL STRIP: NEGATIVE
KETONES UR STRIP-MCNC: NEGATIVE MG/DL
LEUKOCYTE ESTERASE UR QL STRIP: ABNORMAL
MUCOUS THREADS #/AREA URNS LPF: PRESENT /LPF
NITRATE UR QL: NEGATIVE
PH UR STRIP: 6.5 PH (ref 5–7)
RBC #/AREA URNS AUTO: 1 /HPF (ref 0–2)
SOURCE: ABNORMAL
SP GR UR STRIP: 1.03 (ref 1–1.03)
SQUAMOUS #/AREA URNS AUTO: 10 /HPF (ref 0–1)
UROBILINOGEN UR STRIP-MCNC: 2 MG/DL (ref 0–2)
WBC #/AREA URNS AUTO: 16 /HPF (ref 0–5)

## 2021-03-07 PROCEDURE — 87086 URINE CULTURE/COLONY COUNT: CPT | Performed by: OBSTETRICS & GYNECOLOGY

## 2021-03-07 PROCEDURE — 81001 URINALYSIS AUTO W/SCOPE: CPT | Performed by: OBSTETRICS & GYNECOLOGY

## 2021-03-07 PROCEDURE — 250N000013 HC RX MED GY IP 250 OP 250 PS 637: Performed by: OBSTETRICS & GYNECOLOGY

## 2021-03-07 RX ORDER — ACETAMINOPHEN 325 MG/1
975 TABLET ORAL ONCE
Status: COMPLETED | OUTPATIENT
Start: 2021-03-07 | End: 2021-03-07

## 2021-03-07 RX ADMIN — ACETAMINOPHEN 975 MG: 325 TABLET, FILM COATED ORAL at 19:41

## 2021-03-08 LAB
BACTERIA SPEC CULT: NORMAL
Lab: NORMAL
SPECIMEN SOURCE: NORMAL

## 2021-03-08 NOTE — PROVIDER NOTIFICATION
03/07/21 2010   Provider Notification   Provider Name/Title Dr. Slaughter   Method of Notification Phone   Request Evaluate - Remote   Notification Reason Lab/Diagnostic Study     RN updated MD that UA results are abnormal but unchanged from last UA on 2/28/21. MD does not plan to treat for UTI, as those similar results on 2/28 were cultured and did not show infection. Order received to discharge patient home with instructions to rest, hydrate and use tylenol + 50mg benadryl PO for pain. Patient and partner are agreeable with POC.

## 2021-03-08 NOTE — PROVIDER NOTIFICATION
03/07/21 5645   Provider Notification   Provider Name/Title Dr. Slaughter   Method of Notification Phone   Request Evaluate - Remote   Notification Reason Patient Arrived     RN updated MD that patient arrived on unit reporting severe lower back pain, irregular ctx and bright red spotting when she wipes. SVE 1/60/-2, no bloody show or discharge noted on sterile glove after exam. FHR tracing category 1, uterus appears irritable, contractions palpate mild. Patient reports she has hx of UTI and BV this pregnancy. She is unable to void at this time and is currently drinking 500-1000ml water and apple juice; will collect UA when she is able to void. Order received to send UA and text page MD with results. Patient may have tylenol 975mg and/or hot packs for pain.

## 2021-03-08 NOTE — DISCHARGE INSTRUCTIONS
"Discharge Instruction for Undelivered Patients      You were seen for: Labor Assessment  We Consulted: Dr. Slaughter  You had (Test or Medicine):External fetal monitoring, cervical exam, urine analysis     Diet:   Drinking water will help clear bacteria from your urethra and bladder! Try to drink 2+ liters of water daily.  Drink 8 to 12 glasses of liquids (milk, juice, water) every day.  You may eat meals and snacks.     Activity:  Count fetal kicks everyday (see handout)  Call your doctor or nurse midwife if your baby is moving less than usual.     Call your provider if you notice:  Swelling in your face or increased swelling in your hands or legs.  Headaches that are not relieved by Tylenol (acetaminophen).  Changes in your vision (blurring: seeing spots or stars.)  Nausea (sick to your stomach) and vomiting (throwing up).   Weight gain of 5 pounds or more per week.  Heartburn that doesn't go away.  Signs of bladder infection: pain when you urinate (use the toilet), need to go more often and more urgently.  The bag of leonard (rupture of membranes) breaks, or you notice leaking in your underwear.  Bright red blood in your underwear.  Abdominal (lower belly) or stomach pain.  For first baby: Contractions (tightening) less than 5 minutes apart for one hour or more.  Increase or change in vaginal discharge (note the color and amount)    Follow-up:  As scheduled in the clinic       Resources for managing lower back pain:  -Warm bath with epsom salts  -Stretching on yoga/exercise ball  -Tylenol (max 4,000mg per 24 hours)  -Warm packs (i.e.microwaveable rice sock)  -Spinning babies \"three sisters of balance\" --> [email or web address]        "

## 2021-03-08 NOTE — PLAN OF CARE
Data: Patient assessed in the [facility] for uterine contractions, lower back pain and reported vaginal bleeding.  Cervical exam mid position, 1/50/-2.  Membranes intact.  Contractions palpate as irritability.  Action:  Presumed adequate fetal oxygenation documented (see flow record). Discharge instructions reviewed.  Patient instructed to report change in fetal movement, vaginal leaking of fluid or bleeding, abdominal pain, or any concerns related to the pregnancy to her nurse/physician.    Response: Orders to discharge home per Dr. Slaughter.  Patient verbalized understanding of education and verbalized agreement with plan. Discharged to home at 2030.

## 2021-03-08 NOTE — PLAN OF CARE
"Data: Patient presented to [facility]: 3/7/2021  6:26 PM.  Reason for maternal/fetal assessment is uterine contractions, vaginal bleeding and lower back pain. Patient reports some bright red spotting \"every time I wipe after I pee\" for the last 24 hours, contractions occurring \"a few times an hour\" and extreme lower back pain.  Patient is a .  Prenatal record reviewed. Pregnancy has been uncomplicated.  Gestational Age 38w6d. VSS. Fetal movement present. Patient denies leaking of vaginal fluid/rupture of membranes, abdominal pain, nausea, vomiting, headache, visual disturbances, epigastric or URQ pain, significant edema. Support person Ronald is present.   Action: Verbal consent for EFM. Triage assessment completed. Bill of rights reviewed.  Response: Patient verbalized agreement with plan. Will contact Dr Elena Slaughter with update and further orders.    "

## 2021-03-11 ENCOUNTER — HOSPITAL ENCOUNTER (OUTPATIENT)
Facility: CLINIC | Age: 31
Discharge: HOME | End: 2021-03-11
Attending: OBSTETRICS & GYNECOLOGY | Admitting: OBSTETRICS & GYNECOLOGY
Payer: COMMERCIAL

## 2021-03-11 VITALS — DIASTOLIC BLOOD PRESSURE: 57 MMHG | SYSTOLIC BLOOD PRESSURE: 94 MMHG | RESPIRATION RATE: 16 BRPM | TEMPERATURE: 97.7 F

## 2021-03-11 PROCEDURE — 250N000013 HC RX MED GY IP 250 OP 250 PS 637: Performed by: OBSTETRICS & GYNECOLOGY

## 2021-03-11 PROCEDURE — G0463 HOSPITAL OUTPT CLINIC VISIT: HCPCS

## 2021-03-11 RX ORDER — HYDROXYZINE HYDROCHLORIDE 50 MG/1
50 TABLET, FILM COATED ORAL ONCE
Status: COMPLETED | OUTPATIENT
Start: 2021-03-11 | End: 2021-03-11

## 2021-03-11 RX ADMIN — HYDROXYZINE HYDROCHLORIDE 50 MG: 50 TABLET, FILM COATED ORAL at 07:44

## 2021-03-11 NOTE — PLAN OF CARE
Data: Patient assessed in the [facility] for uterine contractions.  Cervical exam unchanged through stay, consistent with prior clinic exam as well.  Membranes intact.  Contractions irregular, approx every 10 minutes per patient report, q 3-7 minutes per toco. Palpate mild, some contractions stronger than others per patient.   Action:  Presumed adequate fetal oxygenation documented (see flow record). Discharge instructions reviewed.  Patient instructed to report change in fetal movement, vaginal leaking of fluid or bleeding, abdominal pain, or any concerns related to the pregnancy to her nurse/physician.    Response: Orders to discharge home per Ana Laura Childress.  Patient verbalized understanding of education and verbalized agreement with plan. Discharged to home with sig other.

## 2021-03-11 NOTE — PROVIDER NOTIFICATION
03/11/21 0700   Provider Notification   Provider Name/Title Dr. Childress   Method of Notification In Department   Orders received from Dr. Childress to check cervix and if cervix is unchanged, discharge patient to home with labor precautions.  Orders to give vistaril 50mg PO once PRN.

## 2021-03-11 NOTE — DISCHARGE INSTRUCTIONS
Discharge Instruction for Undelivered Patients      You were seen for: Labor Assessment  We Consulted: Dr Childress  You had (Test or Medicine):fetal and uterine monitoring, cervical exam, Vistaril 50mg oralX1     Diet:   Drink 8 to 12 glasses of liquids (milk, juice, water) every day.  You may eat meals and snacks.     Activity:  Count fetal kicks everyday (see handout)  Call your doctor or nurse midwife if your baby is moving less than usual.     Call your provider if you notice:  Swelling in your face or increased swelling in your hands or legs.  Headaches that are not relieved by Tylenol (acetaminophen).  Changes in your vision (blurring: seeing spots or stars.)  Nausea (sick to your stomach) and vomiting (throwing up).   Weight gain of 5 pounds or more per week.  Heartburn that doesn't go away.  Signs of bladder infection: pain when you urinate (use the toilet), need to go more often and more urgently.  The bag of leonard (rupture of membranes) breaks, or you notice leaking in your underwear.  Bright red blood in your underwear.  Abdominal (lower belly) or stomach pain.  For first baby: Contractions (tightening) less than 5 minutes apart for one hour or more.  Increase or change in vaginal discharge (note the color and amount)  Other: Call clinic with any questions or concerns.     Follow-up:  As scheduled in the clinic

## 2021-03-11 NOTE — PROVIDER NOTIFICATION
03/11/21 0610   Provider Notification   Provider Name/Title Dr. Childress   Method of Notification In Department   Request Evaluate - Remote   Notification Reason Patient Arrived;Uterine Activity;Pain;SVE   Notified MD of pt arrival with c/o contractions and low back pain.  Contractions q 3-5 minutes per toco.  SVE unchanged from clinic exam on 3/8.  Patient denies leaking of fluid or bleeding.  Patient requesting pain relief.  Hot pack already applied to low back with no improvement, patient states she has tried tylenol with no relief.  Orders received to observe for one hour and recheck cervix.

## 2021-03-11 NOTE — PLAN OF CARE
Data: [facility]: 3/11/2021  5:15 AM.  Reason for maternal/fetal assessment is uterine contractions. Patient reports andre on/off for past day. Contractions have been every 8-10 minutes for past two hours.  Patient is a .  Prenatal record reviewed. Pregnancy has been uncomplicated..  Gestational Age 39w3d. VSS. Fetal movement present. Patient denies leaking of vaginal fluid/rupture of membranes, vaginal bleeding, abdominal pain, pelvic pressure, nausea, vomiting, headache, visual disturbances, epigastric or URQ pain, significant edema. Support person is present.   Action: Verbal consent for EFM. Triage assessment completed. Bill of rights reviewed.  Response: Patient verbalized agreement with plan. Will contact Dr Ana Laura Childress with update and further orders.

## 2021-03-17 DIAGNOSIS — M79.7 FIBROMYALGIA: Primary | Chronic | ICD-10-CM

## 2021-03-17 DIAGNOSIS — O48.0 POST TERM PREGNANCY, ANTEPARTUM CONDITION OR COMPLICATION: ICD-10-CM

## 2021-03-18 ENCOUNTER — HOSPITAL ENCOUNTER (OUTPATIENT)
Facility: CLINIC | Age: 31
Discharge: HOME | End: 2021-03-19
Attending: OBSTETRICS & GYNECOLOGY | Admitting: OBSTETRICS & GYNECOLOGY
Payer: COMMERCIAL

## 2021-03-18 VITALS
WEIGHT: 168 LBS | HEIGHT: 65 IN | HEART RATE: 94 BPM | RESPIRATION RATE: 17 BRPM | DIASTOLIC BLOOD PRESSURE: 58 MMHG | TEMPERATURE: 97.4 F | SYSTOLIC BLOOD PRESSURE: 101 MMHG | OXYGEN SATURATION: 99 % | BODY MASS INDEX: 27.99 KG/M2

## 2021-03-18 DIAGNOSIS — O48.0 POST TERM PREGNANCY, ANTEPARTUM CONDITION OR COMPLICATION: ICD-10-CM

## 2021-03-18 LAB
SARS-COV-2 RNA RESP QL NAA+PROBE: NORMAL
SPECIMEN SOURCE: NORMAL

## 2021-03-18 PROCEDURE — U0005 INFEC AGEN DETEC AMPLI PROBE: HCPCS | Performed by: OBSTETRICS & GYNECOLOGY

## 2021-03-18 PROCEDURE — 84112 EVAL AMNIOTIC FLUID PROTEIN: CPT | Performed by: OBSTETRICS & GYNECOLOGY

## 2021-03-18 PROCEDURE — U0003 INFECTIOUS AGENT DETECTION BY NUCLEIC ACID (DNA OR RNA); SEVERE ACUTE RESPIRATORY SYNDROME CORONAVIRUS 2 (SARS-COV-2) (CORONAVIRUS DISEASE [COVID-19]), AMPLIFIED PROBE TECHNIQUE, MAKING USE OF HIGH THROUGHPUT TECHNOLOGIES AS DESCRIBED BY CMS-2020-01-R: HCPCS | Performed by: OBSTETRICS & GYNECOLOGY

## 2021-03-18 ASSESSMENT — MIFFLIN-ST. JEOR: SCORE: 1482.92

## 2021-03-19 ENCOUNTER — HOSPITAL ENCOUNTER (INPATIENT)
Facility: CLINIC | Age: 31
LOS: 2 days | Discharge: HOME | End: 2021-03-22
Attending: OBSTETRICS & GYNECOLOGY | Admitting: OBSTETRICS & GYNECOLOGY
Payer: COMMERCIAL

## 2021-03-19 DIAGNOSIS — R10.30 LOWER ABDOMINAL PAIN: ICD-10-CM

## 2021-03-19 LAB
LABORATORY COMMENT REPORT: NORMAL
RUPTURE OF FETAL MEMBRANES BY ROM PLUS: NEGATIVE
RUPTURE OF FETAL MEMBRANES BY ROM PLUS: NEGATIVE
SARS-COV-2 RNA RESP QL NAA+PROBE: NEGATIVE
SPECIMEN SOURCE: NORMAL

## 2021-03-19 PROCEDURE — 59025 FETAL NON-STRESS TEST: CPT | Mod: 59

## 2021-03-19 PROCEDURE — 84112 EVAL AMNIOTIC FLUID PROTEIN: CPT | Performed by: OBSTETRICS & GYNECOLOGY

## 2021-03-19 PROCEDURE — G0463 HOSPITAL OUTPT CLINIC VISIT: HCPCS

## 2021-03-19 PROCEDURE — G0463 HOSPITAL OUTPT CLINIC VISIT: HCPCS | Mod: 25

## 2021-03-19 ASSESSMENT — ACTIVITIES OF DAILY LIVING (ADL)
WEAR_GLASSES_OR_BLIND: YES
TOILETING_ISSUES: NO
CONCENTRATING,_REMEMBERING_OR_MAKING_DECISIONS_DIFFICULTY: NO
NUMBER_OF_TIMES_PATIENT_HAS_FALLEN_WITHIN_LAST_SIX_MONTHS: 1
DRESSING/BATHING_DIFFICULTY: NO
FALL_HISTORY_WITHIN_LAST_SIX_MONTHS: YES
DIFFICULTY_COMMUNICATING: NO
DIFFICULTY_EATING/SWALLOWING: NO
WALKING_OR_CLIMBING_STAIRS_DIFFICULTY: NO
DOING_ERRANDS_INDEPENDENTLY_DIFFICULTY: NO

## 2021-03-19 NOTE — PLAN OF CARE
Data: [facility] presented [facility]: 3/18/2021 11:03 PM.  Reason for maternal/fetal assessment is uterine contractions.   Patient is a .  Prenatal record reviewed.   Gestational Age 40w4d. VSS. Fetal movement present. Patient reports increased LOF.  Action: Verbal consent for EFM. Triage assessment completed. Bill of rights reviewed. ROM+ performed x 2 (see previous notes) Cervical exam showed no notable change.  Per MD pt to be discharged home.  Membranes with questionable ROM, ROM+ performed x 2.      Fetal oxygenation documented (see flow record). Patient is andre regularly approximately every 4 minutes. Discharge instructions reviewed.  Patient instructed to report change in fetal movement, vaginal leaking of fluid or bleeding, abdominal pain, or when contractions become more intense and/or closer together, or any concerns related to the pregnancy to her nurse/physician.    Response: Orders to discharge home per .  Patient verbalized understanding of education and verbalized agreement with plan.

## 2021-03-19 NOTE — PROVIDER NOTIFICATION
03/19/21 0030   Provider Notification   Provider Name/Title Dr. Slaughter   Method of Notification Phone   Request Evaluate - Remote   Notification Reason Patient Arrived;Membrane Status;Labor Status;Uterine Activity;Pain;SVE       Call to Dr Slaughter.  Advised that patient arrived around 2310 for r/o labor and r/o SROM.  Patient reports onset of contractions at 0400 with vaginal discharge all day.  She is unsure if her bag of membranes ruptures.     ROM + performed:  Negative  FHR - WNL / category I tracing.  SVE upon arrival shows minimal change from clinic check at 3/80/-1  Contractions regular every 4-5 minutes, palpate as mild, patient rating pain as a 9 on pain scale.  Plan of care per MD:  Keep patient for additional hour and recheck SVE.  If 0-1/2 cm change ok to discharge home without calling MD back.  If > than 1/2 MD would like a call.  Kitty Troy, RN on 3/19/2021 at 12:35 AM

## 2021-03-19 NOTE — DISCHARGE INSTRUCTIONS
Discharge Instruction for Undelivered Patients      You were seen for: Labor Assessment  We Consulted: Dr. Slaughter  You had (Test or Medicine):ROM+, Non Stress Test    Diet:   Drink 8 to 12 glasses of liquids (milk, juice, water) every day.  You may eat meals and snacks.     Activity:  Count fetal kicks everyday (see handout)  Call your doctor or nurse midwife if your baby is moving less than usual.     Call your provider if you notice:  Swelling in your face or increased swelling in your hands or legs.  Headaches that are not relieved by Tylenol (acetaminophen).  Changes in your vision (blurring: seeing spots or stars.)  Nausea (sick to your stomach) and vomiting (throwing up).   Weight gain of 5 pounds or more per week.  Heartburn that doesn't go away.  Signs of bladder infection: pain when you urinate (use the toilet), need to go more often and more urgently.  The bag of leonard (rupture of membranes) breaks, or you notice leaking in your underwear.  Bright red blood in your underwear.  Abdominal (lower belly) or stomach pain.  For first baby: Contractions (tightening) less than 5 minutes apart for one hour or more.  Increase or change in vaginal discharge (note the color and amount)      Follow-up:  As scheduled in the clinic

## 2021-03-19 NOTE — PROVIDER NOTIFICATION
03/19/21 0141   Provider Notification   Provider Name/Title Dr. Slaughter   Method of Notification Phone   Request Evaluate - Remote   Notification Reason Membrane Status      Patient was being prepared for discharge home, secondary to minimal to almost no change between SVE findings.  When patient stood she had a small amount of clear watery fluid on her leg.  Order received to repeat ROM+.     Advised no change in contraction pattern, ongoing category I, per MD - no additional monitoring needed.  Await repeat ROM+ result.  If negative - discharge home as previously planned.  Kitty Troy RN on 3/19/2021 at 1:59 AM

## 2021-03-20 ENCOUNTER — ANESTHESIA EVENT (OUTPATIENT)
Dept: OBGYN | Facility: CLINIC | Age: 31
End: 2021-03-20
Payer: COMMERCIAL

## 2021-03-20 ENCOUNTER — ANESTHESIA (OUTPATIENT)
Dept: OBGYN | Facility: CLINIC | Age: 31
End: 2021-03-20
Payer: COMMERCIAL

## 2021-03-20 LAB
ABO + RH BLD: NORMAL
ABO + RH BLD: NORMAL
BLD GP AB SCN SERPL QL: NORMAL
BLOOD BANK CMNT PATIENT-IMP: NORMAL
HGB BLD-MCNC: 10.2 G/DL (ref 11.7–15.7)
SPECIMEN EXP DATE BLD: NORMAL
T PALLIDUM AB SER QL: NONREACTIVE

## 2021-03-20 PROCEDURE — 250N000011 HC RX IP 250 OP 636: Performed by: ANESTHESIOLOGY

## 2021-03-20 PROCEDURE — 36415 COLL VENOUS BLD VENIPUNCTURE: CPT | Performed by: OBSTETRICS & GYNECOLOGY

## 2021-03-20 PROCEDURE — 250N000011 HC RX IP 250 OP 636: Performed by: OBSTETRICS & GYNECOLOGY

## 2021-03-20 PROCEDURE — 85018 HEMOGLOBIN: CPT | Performed by: OBSTETRICS & GYNECOLOGY

## 2021-03-20 PROCEDURE — 999N000011 HC STATISTIC ANESTHESIA CASE

## 2021-03-20 PROCEDURE — 250N000009 HC RX 250: Performed by: OBSTETRICS & GYNECOLOGY

## 2021-03-20 PROCEDURE — 86850 RBC ANTIBODY SCREEN: CPT | Performed by: OBSTETRICS & GYNECOLOGY

## 2021-03-20 PROCEDURE — 120N000001 HC R&B MED SURG/OB

## 2021-03-20 PROCEDURE — 250N000011 HC RX IP 250 OP 636

## 2021-03-20 PROCEDURE — 722N000001 HC LABOR CARE VAGINAL DELIVERY SINGLE

## 2021-03-20 PROCEDURE — 00HU33Z INSERTION OF INFUSION DEVICE INTO SPINAL CANAL, PERCUTANEOUS APPROACH: ICD-10-PCS | Performed by: ANESTHESIOLOGY

## 2021-03-20 PROCEDURE — 370N000003 HC ANESTHESIA WARD SERVICE

## 2021-03-20 PROCEDURE — 86900 BLOOD TYPING SEROLOGIC ABO: CPT | Performed by: OBSTETRICS & GYNECOLOGY

## 2021-03-20 PROCEDURE — 250N000013 HC RX MED GY IP 250 OP 250 PS 637: Performed by: OBSTETRICS & GYNECOLOGY

## 2021-03-20 PROCEDURE — 3E0R3BZ INTRODUCTION OF ANESTHETIC AGENT INTO SPINAL CANAL, PERCUTANEOUS APPROACH: ICD-10-PCS | Performed by: ANESTHESIOLOGY

## 2021-03-20 PROCEDURE — 86780 TREPONEMA PALLIDUM: CPT | Performed by: OBSTETRICS & GYNECOLOGY

## 2021-03-20 PROCEDURE — 258N000003 HC RX IP 258 OP 636: Performed by: OBSTETRICS & GYNECOLOGY

## 2021-03-20 PROCEDURE — 86901 BLOOD TYPING SEROLOGIC RH(D): CPT | Performed by: OBSTETRICS & GYNECOLOGY

## 2021-03-20 PROCEDURE — 0KQM0ZZ REPAIR PERINEUM MUSCLE, OPEN APPROACH: ICD-10-PCS | Performed by: OBSTETRICS & GYNECOLOGY

## 2021-03-20 RX ORDER — OXYTOCIN 10 [USP'U]/ML
10 INJECTION, SOLUTION INTRAMUSCULAR; INTRAVENOUS
Status: DISCONTINUED | OUTPATIENT
Start: 2021-03-20 | End: 2021-03-22 | Stop reason: HOSPADM

## 2021-03-20 RX ORDER — LIDOCAINE HYDROCHLORIDE AND EPINEPHRINE 15; 5 MG/ML; UG/ML
3 INJECTION, SOLUTION EPIDURAL
Status: DISCONTINUED | OUTPATIENT
Start: 2021-03-20 | End: 2021-03-20

## 2021-03-20 RX ORDER — ACETAMINOPHEN 325 MG/1
650 TABLET ORAL EVERY 4 HOURS PRN
Status: DISCONTINUED | OUTPATIENT
Start: 2021-03-20 | End: 2021-03-22 | Stop reason: HOSPADM

## 2021-03-20 RX ORDER — OXYTOCIN/0.9 % SODIUM CHLORIDE 30/500 ML
100 PLASTIC BAG, INJECTION (ML) INTRAVENOUS CONTINUOUS
Status: DISCONTINUED | OUTPATIENT
Start: 2021-03-20 | End: 2021-03-22 | Stop reason: HOSPADM

## 2021-03-20 RX ORDER — ONDANSETRON 2 MG/ML
4 INJECTION INTRAMUSCULAR; INTRAVENOUS EVERY 6 HOURS PRN
Status: DISCONTINUED | OUTPATIENT
Start: 2021-03-20 | End: 2021-03-20

## 2021-03-20 RX ORDER — METHYLERGONOVINE MALEATE 0.2 MG/ML
200 INJECTION INTRAVENOUS
Status: DISCONTINUED | OUTPATIENT
Start: 2021-03-20 | End: 2021-03-22 | Stop reason: HOSPADM

## 2021-03-20 RX ORDER — FENTANYL CITRATE 50 UG/ML
50-100 INJECTION, SOLUTION INTRAMUSCULAR; INTRAVENOUS
Status: DISCONTINUED | OUTPATIENT
Start: 2021-03-20 | End: 2021-03-20

## 2021-03-20 RX ORDER — MISOPROSTOL 200 UG/1
TABLET ORAL
Status: DISCONTINUED
Start: 2021-03-20 | End: 2021-03-20 | Stop reason: WASHOUT

## 2021-03-20 RX ORDER — NALOXONE HYDROCHLORIDE 0.4 MG/ML
0.4 INJECTION, SOLUTION INTRAMUSCULAR; INTRAVENOUS; SUBCUTANEOUS
Status: DISCONTINUED | OUTPATIENT
Start: 2021-03-20 | End: 2021-03-20

## 2021-03-20 RX ORDER — AMOXICILLIN 250 MG
2 CAPSULE ORAL 2 TIMES DAILY
Status: DISCONTINUED | OUTPATIENT
Start: 2021-03-20 | End: 2021-03-22 | Stop reason: HOSPADM

## 2021-03-20 RX ORDER — ONDANSETRON 4 MG/1
4 TABLET, ORALLY DISINTEGRATING ORAL EVERY 8 HOURS PRN
Status: DISCONTINUED | OUTPATIENT
Start: 2021-03-20 | End: 2021-03-22 | Stop reason: HOSPADM

## 2021-03-20 RX ORDER — ONDANSETRON 4 MG/1
4 TABLET, ORALLY DISINTEGRATING ORAL EVERY 6 HOURS PRN
Status: DISCONTINUED | OUTPATIENT
Start: 2021-03-20 | End: 2021-03-20

## 2021-03-20 RX ORDER — OXYCODONE AND ACETAMINOPHEN 5; 325 MG/1; MG/1
1 TABLET ORAL
Status: DISCONTINUED | OUTPATIENT
Start: 2021-03-20 | End: 2021-03-20

## 2021-03-20 RX ORDER — FENTANYL CITRATE-0.9 % NACL/PF 10 MCG/ML
100 PLASTIC BAG, INJECTION (ML) INTRAVENOUS EVERY 5 MIN PRN
Status: DISCONTINUED | OUTPATIENT
Start: 2021-03-20 | End: 2021-03-20

## 2021-03-20 RX ORDER — TRANEXAMIC ACID 10 MG/ML
1 INJECTION, SOLUTION INTRAVENOUS EVERY 30 MIN PRN
Status: DISCONTINUED | OUTPATIENT
Start: 2021-03-20 | End: 2021-03-20

## 2021-03-20 RX ORDER — IBUPROFEN 800 MG/1
800 TABLET, FILM COATED ORAL EVERY 6 HOURS PRN
Status: DISCONTINUED | OUTPATIENT
Start: 2021-03-20 | End: 2021-03-22 | Stop reason: HOSPADM

## 2021-03-20 RX ORDER — TRANEXAMIC ACID 10 MG/ML
1 INJECTION, SOLUTION INTRAVENOUS EVERY 30 MIN PRN
Status: DISCONTINUED | OUTPATIENT
Start: 2021-03-20 | End: 2021-03-22 | Stop reason: HOSPADM

## 2021-03-20 RX ORDER — MISOPROSTOL 200 UG/1
400 TABLET ORAL
Status: DISCONTINUED | OUTPATIENT
Start: 2021-03-20 | End: 2021-03-22 | Stop reason: HOSPADM

## 2021-03-20 RX ORDER — AMOXICILLIN 250 MG
1 CAPSULE ORAL 2 TIMES DAILY
Status: DISCONTINUED | OUTPATIENT
Start: 2021-03-20 | End: 2021-03-22 | Stop reason: HOSPADM

## 2021-03-20 RX ORDER — HYDROCORTISONE 2.5 %
CREAM (GRAM) TOPICAL 3 TIMES DAILY PRN
Status: DISCONTINUED | OUTPATIENT
Start: 2021-03-20 | End: 2021-03-22 | Stop reason: HOSPADM

## 2021-03-20 RX ORDER — OXYTOCIN/0.9 % SODIUM CHLORIDE 30/500 ML
100-340 PLASTIC BAG, INJECTION (ML) INTRAVENOUS CONTINUOUS PRN
Status: COMPLETED | OUTPATIENT
Start: 2021-03-20 | End: 2021-03-20

## 2021-03-20 RX ORDER — NALOXONE HYDROCHLORIDE 0.4 MG/ML
0.2 INJECTION, SOLUTION INTRAMUSCULAR; INTRAVENOUS; SUBCUTANEOUS
Status: DISCONTINUED | OUTPATIENT
Start: 2021-03-20 | End: 2021-03-20

## 2021-03-20 RX ORDER — BISACODYL 10 MG
10 SUPPOSITORY, RECTAL RECTAL DAILY PRN
Status: DISCONTINUED | OUTPATIENT
Start: 2021-03-22 | End: 2021-03-22 | Stop reason: HOSPADM

## 2021-03-20 RX ORDER — METHYLERGONOVINE MALEATE 0.2 MG/ML
200 INJECTION INTRAVENOUS
Status: COMPLETED | OUTPATIENT
Start: 2021-03-20 | End: 2021-03-20

## 2021-03-20 RX ORDER — ACETAMINOPHEN 325 MG/1
650 TABLET ORAL EVERY 4 HOURS PRN
Status: DISCONTINUED | OUTPATIENT
Start: 2021-03-20 | End: 2021-03-20

## 2021-03-20 RX ORDER — OXYTOCIN 10 [USP'U]/ML
10 INJECTION, SOLUTION INTRAMUSCULAR; INTRAVENOUS
Status: DISCONTINUED | OUTPATIENT
Start: 2021-03-20 | End: 2021-03-20

## 2021-03-20 RX ORDER — CARBOPROST TROMETHAMINE 250 UG/ML
250 INJECTION, SOLUTION INTRAMUSCULAR
Status: DISCONTINUED | OUTPATIENT
Start: 2021-03-20 | End: 2021-03-20

## 2021-03-20 RX ORDER — MODIFIED LANOLIN
OINTMENT (GRAM) TOPICAL
Status: DISCONTINUED | OUTPATIENT
Start: 2021-03-20 | End: 2021-03-22 | Stop reason: HOSPADM

## 2021-03-20 RX ORDER — BUPIVACAINE HYDROCHLORIDE 2.5 MG/ML
INJECTION, SOLUTION EPIDURAL; INFILTRATION; INTRACAUDAL
Status: DISCONTINUED | OUTPATIENT
Start: 2021-03-20 | End: 2021-03-20

## 2021-03-20 RX ORDER — CARBOPROST TROMETHAMINE 250 UG/ML
250 INJECTION, SOLUTION INTRAMUSCULAR
Status: DISCONTINUED | OUTPATIENT
Start: 2021-03-20 | End: 2021-03-22 | Stop reason: HOSPADM

## 2021-03-20 RX ORDER — NALBUPHINE HYDROCHLORIDE 10 MG/ML
2.5-5 INJECTION, SOLUTION INTRAMUSCULAR; INTRAVENOUS; SUBCUTANEOUS EVERY 6 HOURS PRN
Status: DISCONTINUED | OUTPATIENT
Start: 2021-03-20 | End: 2021-03-20

## 2021-03-20 RX ORDER — FAMOTIDINE 20 MG/1
20 TABLET, FILM COATED ORAL 2 TIMES DAILY
Status: DISCONTINUED | OUTPATIENT
Start: 2021-03-20 | End: 2021-03-22 | Stop reason: HOSPADM

## 2021-03-20 RX ORDER — SODIUM CHLORIDE, SODIUM LACTATE, POTASSIUM CHLORIDE, CALCIUM CHLORIDE 600; 310; 30; 20 MG/100ML; MG/100ML; MG/100ML; MG/100ML
INJECTION, SOLUTION INTRAVENOUS CONTINUOUS
Status: DISCONTINUED | OUTPATIENT
Start: 2021-03-20 | End: 2021-03-20

## 2021-03-20 RX ORDER — OXYTOCIN/0.9 % SODIUM CHLORIDE 30/500 ML
340 PLASTIC BAG, INJECTION (ML) INTRAVENOUS CONTINUOUS PRN
Status: DISCONTINUED | OUTPATIENT
Start: 2021-03-20 | End: 2021-03-22 | Stop reason: HOSPADM

## 2021-03-20 RX ORDER — FENTANYL/BUPIVACAINE/NS/PF 2-1250MCG
PLASTIC BAG, INJECTION (ML) INJECTION
Status: COMPLETED
Start: 2021-03-20 | End: 2021-03-20

## 2021-03-20 RX ORDER — EPHEDRINE SULFATE 50 MG/ML
INJECTION, SOLUTION INTRAMUSCULAR; INTRAVENOUS; SUBCUTANEOUS
Status: DISCONTINUED
Start: 2021-03-20 | End: 2021-03-20 | Stop reason: WASHOUT

## 2021-03-20 RX ORDER — IBUPROFEN 800 MG/1
800 TABLET, FILM COATED ORAL
Status: COMPLETED | OUTPATIENT
Start: 2021-03-20 | End: 2021-03-20

## 2021-03-20 RX ADMIN — ACETAMINOPHEN 650 MG: 325 TABLET, FILM COATED ORAL at 14:17

## 2021-03-20 RX ADMIN — FAMOTIDINE 20 MG: 20 TABLET ORAL at 20:22

## 2021-03-20 RX ADMIN — IBUPROFEN 800 MG: 800 TABLET ORAL at 20:22

## 2021-03-20 RX ADMIN — ACETAMINOPHEN 650 MG: 325 TABLET, FILM COATED ORAL at 22:02

## 2021-03-20 RX ADMIN — DOCUSATE SODIUM 50 MG AND SENNOSIDES 8.6 MG 1 TABLET: 8.6; 5 TABLET, FILM COATED ORAL at 20:22

## 2021-03-20 RX ADMIN — SODIUM CHLORIDE, POTASSIUM CHLORIDE, SODIUM LACTATE AND CALCIUM CHLORIDE: 600; 310; 30; 20 INJECTION, SOLUTION INTRAVENOUS at 01:02

## 2021-03-20 RX ADMIN — FENTANYL CITRATE 100 MCG: 50 INJECTION, SOLUTION INTRAMUSCULAR; INTRAVENOUS at 07:39

## 2021-03-20 RX ADMIN — Medication: at 03:02

## 2021-03-20 RX ADMIN — IBUPROFEN 800 MG: 800 TABLET ORAL at 14:17

## 2021-03-20 RX ADMIN — IBUPROFEN 800 MG: 800 TABLET ORAL at 08:04

## 2021-03-20 RX ADMIN — ONDANSETRON 4 MG: 2 INJECTION INTRAMUSCULAR; INTRAVENOUS at 06:28

## 2021-03-20 RX ADMIN — FENTANYL CITRATE 50 MCG: 50 INJECTION, SOLUTION INTRAMUSCULAR; INTRAVENOUS at 01:10

## 2021-03-20 RX ADMIN — Medication 340 ML/HR: at 07:35

## 2021-03-20 RX ADMIN — LIDOCAINE HYDROCHLORIDE 10 ML: 10 INJECTION, SOLUTION EPIDURAL; INFILTRATION; INTRACAUDAL; PERINEURAL at 07:41

## 2021-03-20 RX ADMIN — BUPIVACAINE HYDROCHLORIDE 10 ML: 2.5 INJECTION, SOLUTION EPIDURAL; INFILTRATION; INTRACAUDAL at 02:27

## 2021-03-20 RX ADMIN — ACETAMINOPHEN 650 MG: 325 TABLET, FILM COATED ORAL at 08:04

## 2021-03-20 RX ADMIN — SODIUM CHLORIDE, POTASSIUM CHLORIDE, SODIUM LACTATE AND CALCIUM CHLORIDE 500 ML: 600; 310; 30; 20 INJECTION, SOLUTION INTRAVENOUS at 02:10

## 2021-03-20 RX ADMIN — METHYLERGONOVINE MALEATE 200 MCG: 0.2 INJECTION, SOLUTION INTRAMUSCULAR; INTRAVENOUS at 07:37

## 2021-03-20 RX ADMIN — SODIUM CHLORIDE, POTASSIUM CHLORIDE, SODIUM LACTATE AND CALCIUM CHLORIDE: 600; 310; 30; 20 INJECTION, SOLUTION INTRAVENOUS at 02:40

## 2021-03-20 RX ADMIN — ACETAMINOPHEN 650 MG: 325 TABLET, FILM COATED ORAL at 18:02

## 2021-03-20 NOTE — L&D DELIVERY NOTE
Sathya Nelson is a 30 year old  who was admitted at 40w5d for latent labor.  Pregnancy was complicated by GERD, Hep B NI status and fibromyalgia.  She underwent labor augmentation with nothing.  SROM occurred notable for clear fluid. She was noted to be complete at 0545.  She pushed effectively and delivered a viable female infant at 0730 with APGARs of 8 and 9 respectively, weight 3020 grams.  Spontaneously delivery of an intact placenta with a 3-V cord ensued shortly thereafter.  She received 30 units of IV pitocin as a uterotonic agent as well as 0.2 mg IM methergine.  Exam of the perineum revealed a 2nd degree laceration (intact sphincter and capsule) which was repaired in standard fashion using a 3-0 vicryl suture with one figure of x using a 4-0 vicryl.  EBL 425cc, QBL pending.    Katherin Hilton MD   Pager: [phone]   2021, 7:53 AM     Delivery Summary    Sathya Nelson MRN# [id number]   Age: 30 year old YOB: 1990          Leslie, Female-Sathya [[id number]]    Labor Event Times    Labor onset date: 3/19/21 Onset time: 11:00 PM   Dilation complete date: 3/20/21 Complete time:  5:45 AM   Start pushing date/time: 3/20/2021 0605      Labor Length    1st Stage (hrs): 6 (min): 45   2nd Stage (hrs): 1 (min): 45   3rd Stage (hrs): 0 (min): 3      Labor Events     labor?: No   steroids: None  Labor Type: Spontaneous  Predominate monitoring during 1st stage: continuous electronic fetal monitoring     Antibiotics received during labor?: No     Augmentation: None     Delivery/Placenta Date and Time    Delivery Date: 3/20/21 Delivery Time:  7:30 AM   Placenta Date/Time: 3/20/2021  7:34 AM   Other personnel present at delivery:  Provider Role   Clementine Casas RN Registered Nurse   Preeti Carl RN Delivery Assist   Katherin Adams MD MD         Vaginal Counts     Initial count performed by 2 team members:  Two Team Members   Dr Lida OBYD Rn     "   Needles Suture Needles Sponges (RETIRED) Instruments   Initial counts 2  5    Added to count  2     Relief counts       Final counts             Placed during labor Accounted for at the end of labor   FSE No    IUPC No    Cervadil No                      Apgars    Living status: Living   1 Minute 5 Minute 10 Minute 15 Minute 20 Minute   Skin color:        Heart rate:        Reflex irritability:        Muscle tone:        Respiratory effort:        Total:           Cord    Vessels: 3 Vessels    Cord Complications: None               Cord Blood Disposition: Lab    Gases Sent?: No    Delayed cord clamping?: Yes    Stem cell collection?: No       [place] Resuscitation    Output in Delivery Room: Stool      Measurements    Weight: 6 lb 10.5 oz Length: 1' 7.5\"   Head circumference: 33.5 cm    Output in delivery room: Stool     Labor Events and Shoulder Dystocia    Fetal Tracing Prior to Delivery: Category 2  Shoulder dystocia present?: Neg     Delivery (Maternal) (Provider to Complete) (700193)    Episiotomy: None  Perineal lacerations: 2nd Repaired?: Yes   Labial laceration: bilateral Repaired?: No   Repair suture: 4-0 Vicryl, 3-0 Vicryl     Blood Loss  Mother: Sathya Nelson #[id number]   Start of Mother's Information    IO Blood Loss  21 2300 - 21 0753    None           End of Mother's Information  Mother: Sathya Nelson #[id number]          Delivery - Provider to Complete ([id number])    Attempted Delivery Types (Choose all that apply): Spontaneous Vaginal Delivery  Delivery Type (Choose the 1 that will go to the Birth History): Vaginal, Spontaneous                   Other personnel:  Provider Role   Clementine Casas RN Registered Nurse   Preeti Carl RN Delivery Assist   Katherin Adams MD MD                Placenta    Date/Time: 3/20/2021  7:34 AM  Removal: Expressed  Disposition: Hospital disposal           Anesthesia    Method: Epidural  Cervical dilation at placement: 4-7     "            Presentation and Position    Presentation: Vertex    Position: Right Occiput Anterior                 Katherin Adams MD

## 2021-03-20 NOTE — PLAN OF CARE
[facility] 10[facility]12 [facility].  Reason for maternal/fetal assessment is uterine contractions. Patient reports Pt was in last night for ROL assessment, Writer asked pt was she felt has changed, pt states that last night ctxs were every 4 minutes and they are now every 3 minutes and more intense within the last few hours.  Patient is a .  Prenatal record reviewed. Pregnancy  has been complicated by has been uncomplicated.  Gestational Age 40w4d. VSS. Fetal movement present. Patient denies leaking of vaginal fluid/rupture of membranes, vaginal bleeding, abdominal pain, pelvic pressure, nausea, vomiting, headache, visual disturbances, epigastric or URQ pain, significant edema. Support person is present.   Action: Verbal consent for EFM. Triage assessment completed. Bill of rights reviewed.  Response: Patient verbalized agreement with plan. Will contact Dr Ana Laura Childress with update and further orders.

## 2021-03-20 NOTE — PROGRESS NOTES
[id number] intermittently picking up heart rate between 100-110.  This was when patient was positioned in sitting position for epidural placement.  [place] adjusted and FHR audible at normal range.  Pulse oximeter on mom confirms maternal HR in range of 100-110.  Kitty Troy RN on 3/20/2021 at 3:25 AM

## 2021-03-20 NOTE — PROVIDER NOTIFICATION
03/19/21 224   Provider Notification   Provider Name/Title Dr. Childress    Method of Notification Electronic Page   Request Evaluate - Remote   Notification Reason Patient Arrived     MD returned page, updated MD on pt arrival. Pt was in last night for ROL assessment, Writer asked pt was she felt has changed, pt states that last night ctxs were every 4 minutes and they are now every 3 minutes and more intense within the last few hours. SVE 3-3.5/90/-1, potentially unchanged from last night.  baseline with accels, moderate variability and some earlys present. MD wants to monitor pt for next hour or two to see if there is change in status and then will recheck cervix.    Pt does have some mucous with a blood tinge to it present.

## 2021-03-20 NOTE — H&P
[facility] Labor and Delivery History and Physical    Sathya Nelson MRN# [id number]   Age: 30 year old YOB: 1990     Date of Admission:  3/19/2021         HPI:   Sathya Nelson is a 30 year old  at 40w5d by LMP admitted for contractions.  She was in last night but states the contractions were more painful and often this am.  She made cervical changed and was admitted.  She denies any vaginal bleeding, leakage of fluid or changes in her vaginal discharge.  Good fetal movement      Pregnancy complications:  - Hep B NI  - GERD  - Fibromyalgia          Pregnancy history:     OBSTETRIC HISTORY:  OB History    Para Term  AB Living   1 0 0 0 0 0   SAB TAB Ectopic Multiple Live Births   0 0 0 0 0      # Outcome Date GA Lbr Vik/2nd Weight Sex Delivery Anes PTL Lv   1 Current                EDC: Estimated Date of Delivery: Mar 15, 2021    Prenatal Labs:   Lab Results   Component Value Date    ABO O 2021    RH Pos 2021    AS Neg 2021    HEPBANG NEGATIVE 2020    CHPCRT  2016     Negative   Negative for C. trachomatis rRNA by transcription mediated amplification.   A negative result by transcription mediated amplification does not preclude the   presence of C. trachomatis infection because results are dependent on proper   and adequate collection, absence of inhibitors, and sufficient rRNA to be   detected.      GCPCRT  2016     Negative   Negative for N. gonorrhoeae rRNA by transcription mediated amplification.   A negative result by transcription mediated amplification does not preclude the   presence of N. gonorrhoeae infection because results are dependent on proper   and adequate collection, absence of inhibitors, and sufficient rRNA to be   detected.      RUBELLAABIGG IMMUNE 2020    HGB 10.2 (L) 2021       GBS Status:   Lab Results   Component Value Date    GBS negative 2021       Ultrasounds:  Unremarkable 20 week anatomy  scan        Maternal Past Medical History:     Past Medical History:   Diagnosis Date     Fibromyalgia      History reviewed. No pertinent surgical history.   Medications Prior to Admission   Medication Sig Dispense Refill Last Dose     doxylamine (UNISOM) 25 MG TABS tablet Take 0.5 tablets (12.5 mg) by mouth daily as needed for other (nausea) 15 tablet 0      metroNIDAZOLE (FLAGYL) 500 MG tablet Take 500 mg by mouth 2 times daily        ondansetron (ZOFRAN ODT) 4 MG ODT tab Take 1 tablet (4 mg) by mouth every 8 hours as needed for nausea 10 tablet 0      Prenatal Vit-Fe Fumarate-FA (PNV PRENATAL PLUS MULTIVITAMIN) 27-1 MG TABS per tablet Take 1 tablet by mouth daily        pyridOXINE (VITAMIN B6) 25 MG tablet Take 1 tablet (25 mg) by mouth 3 times daily as needed (vomiting) 30 tablet 0      ranitidine (ZANTAC) 150 MG tablet Take 1 tablet (150 mg) by mouth 2 times daily 60 tablet 1      sucralfate (CARAFATE) 1 GM tablet Take 1 tablet (1 g) by mouth 4 times daily 40 tablet 1      tiZANidine (ZANAFLEX) 2 MG tablet Take 1 tablet (2 mg) by mouth 3 times daily 30 tablet 1            Family History:   family history includes Diabetes in her mother; Hypertension in her mother.          Social History:     Social History     Tobacco Use     Smoking status: Never Smoker     Smokeless tobacco: Never Used   Substance Use Topics     Alcohol use: No            Review of Systems:   The Review of Systems is negative other than noted in the HPI          Physical Exam:     Patient Vitals for the past 8 hrs:   BP Temp Temp src Pulse Resp SpO2   03/20/21 0259 103/59 -- -- 98 18 --   03/20/21 0257 99/63 -- -- 100 18 --   03/20/21 0255 101/58 -- -- 100 18 --   03/20/21 0250 113/56 -- -- 99 18 --   03/20/21 0245 110/58 -- -- -- 18 100 %   03/20/21 0243 105/52 -- -- -- -- --   03/20/21 0240 100/65 -- -- 98 18 100 %   03/20/21 0236 105/52 -- -- -- -- 100 %   03/20/21 0234 109/57 -- -- 95 18 100 %   03/20/21 0232 107/56 -- -- -- 18 100 %    21 0230 107/58 -- -- -- -- --   218 110/55 -- -- -- -- --   21 0226 112/57 -- -- -- -- 100 %   21 0224 111/58 -- -- -- -- --   21 0140 -- -- -- -- -- 98 %   21 0135 -- -- -- -- -- 99 %   21 0130 -- -- -- -- -- 98 %   21 0125 -- -- -- -- -- 99 %   21 0120 113/57 -- -- 95 18 98 %   21 0116 -- -- -- 89 16 99 %   21 0040 108/63 97.4  F (36.3  C) Oral 96 18 --   21 2323 111/64 97.5  F (36.4  C) Oral 91 18 --     Gen: Pleasant, NAD   CV:  Regular rate and rhythm, no murmurs, rubs or gallops appreciated   Resp: Non-labored breathing.  Lungs clear to ausculation bilaterally  Abd: soft, non-tender and non-distended  Ext: no pedal edema bilaterally     Cervix: 10/100%/+1  Membranes: ruptured  EFW: 7 lbs 10 oz  Presentation:Cephalic    Fetal Heart Rate Tracing:   Baseline 160  Variability: normal  Accelerations: Not Present  Decelerations: Variable decelerations  Interpretation: equivocal    Contractions: q 1.5-4.5 min per external toco        Assessment:   Sathya Nelson is a 30 year old  at 40w5d admitted for latent labor.        Plan:     Labor:   Complete and pushing     Pain: Comfortable with epidural    FWB:   - Continous EFM   - Category 2 FHT     Other:   - Rh positive, RI, placenta anterior  - S/p flu and Tdap  - Hep B NI status   - GBS negative   - Breast feeding     Ana Laura Childress,     2021

## 2021-03-20 NOTE — PLAN OF CARE
Data: Sathya Nelson transferred to [facility] via wheelchair at 0955. Baby transferred via parent's arms.  Action: Receiving unit notified of transfer: Yes. Patient and family notified of room change. Report given to Angi CEDILLO at 0955. Belongings sent to receiving unit. Accompanied by Registered Nurse. Oriented patient to surroundings. Call light within reach. ID bands double-checked with receiving RN.  Response: Patient tolerated transfer and is stable.

## 2021-03-20 NOTE — LACTATION NOTE
Lactation visit. Patient stated breastfeeding going ok but she was concerned if infant was getting enough. Attempted to latch infant with and without shield but infant was too sleepy. Last fed 2.5 hours prior with shield. Educated patient on hand expression and discussed normal course of lactation, and normal  behavior.

## 2021-03-20 NOTE — PROVIDER NOTIFICATION
03/20/21 0003   Provider Notification   Provider Name/Title Dr. Childress   Method of Notification Phone   Request Evaluate - Remote   Notification Reason Decels;Labor Status;Uterine Activity;Pain;Status Update;SVE       Call to Dr. Childress - advised of the following:      SVE has shown change.  Now 4/95/0   Contractions:  Now palpate as moderate.  Q 4 minutes, patient rating pain as a '10'  Fetal HR WNL - has had two variables.  One 15x15, one with geraldo of 60, 15-20 second duration, resolved with maternal position change    Per MD - Admit patient to labor and delivery. Verbal orders received, read back, and placed for labor admit, IV with LR, labs to be drawn HGB, T&S, RPR.    Patient can have fentanyl  if desired.  Place consult for anesthesia.  Patient can have epidural upon request.       RN to update MD should patient's labor progress stall out or if MD consult indicated.  Kitty Troy RN on 3/20/2021 at 12:16 AM

## 2021-03-20 NOTE — PROGRESS NOTES
While on the phone with MD FHR tracing did show a likely variable deceleration.  Elian 90, duration 40 seconds.  Tracing not consistent with break both prior to and post apparent deceleration.  Possible maternal vs. Fetal decel. FHR post within normal range, moderate variability and accelerations.  Will continue to monitor.  Kitty Troy RN on 3/20/2021 at 1:38 AM

## 2021-03-20 NOTE — PLAN OF CARE
Pt stable. Transfer on shift. Bonding noted between parents and infant. SO at bedside, supportive and involved.

## 2021-03-20 NOTE — ANESTHESIA PREPROCEDURE EVALUATION
Anesthesia Pre-Procedure Evaluation    Patient: Sathya Nelson   MRN: [id number] : 1990        Preoperative Diagnosis: * No surgery found *   Procedure :      Past Medical History:   Diagnosis Date     Fibromyalgia       History reviewed. No pertinent surgical history.   Allergies   Allergen Reactions     Amoxicillin Nausea     Penicillins Nausea and Swelling      Social History     Tobacco Use     Smoking status: Never Smoker     Smokeless tobacco: Never Used   Substance Use Topics     Alcohol use: No      Wt Readings from Last 1 Encounters:   21 76.2 kg (168 lb)        Anesthesia Evaluation   Pt has not had prior anesthetic     No history of anesthetic complications       ROS/MED HX  ENT/Pulmonary:  - neg pulmonary ROS     Neurologic:  - neg neurologic ROS     Cardiovascular:  - neg cardiovascular ROS     METS/Exercise Tolerance:     Hematologic:  - neg hematologic  ROS     Musculoskeletal:       GI/Hepatic:  - neg GI/hepatic ROS     Renal/Genitourinary:       Endo:  - neg endo ROS     Psychiatric/Substance Use:  - neg psychiatric ROS     Infectious Disease:       Malignancy:       Other:     (-) previous  and TOLAC candidate       Physical Exam    Airway        Mallampati: II   TM distance: > 3 FB   Neck ROM: full   Mouth opening: > 3 cm    Respiratory Devices and Support         Dental  no notable dental history         Cardiovascular   cardiovascular exam normal          Pulmonary   pulmonary exam normal                OUTSIDE LABS:  CBC:   Lab Results   Component Value Date    WBC 12.7 (H) 2021    WBC 11.0 2020    HGB 10.2 (L) 2021    HGB 10.2 (L) 2021    HCT 32.2 (L) 2021    HCT 36.0 2020     2021     2020     BMP:   Lab Results   Component Value Date     2020     2019    POTASSIUM 3.6 2020    POTASSIUM 4.0 2019    CHLORIDE 105 2020    CHLORIDE 108 2019    CO2 27 2020     CO2 26 12/08/2019    BUN 7 08/02/2020    BUN 10 12/08/2019    CR 0.52 08/02/2020    CR 0.50 (L) 12/08/2019    GLC 79 08/02/2020    GLC 87 12/08/2019     COAGS: No results found for: PTT, INR, FIBR  POC:   Lab Results   Component Value Date    HCG Negative 12/08/2019    HCGS Negative 10/15/2017     HEPATIC:   Lab Results   Component Value Date    ALBUMIN 3.3 (L) 10/15/2017    PROTTOTAL 7.2 10/15/2017    ALT 12 10/15/2017    AST 11 10/15/2017    ALKPHOS 69 10/15/2017    BILITOTAL 0.1 (L) 10/15/2017     OTHER:   Lab Results   Component Value Date    JENNIFER 8.5 08/02/2020    LIPASE 200 10/15/2017    AMYLASE 90 10/10/2011    SED 36 (H) 05/03/2006       Anesthesia Plan    ASA Status:  2      Anesthesia Type: Epidural.              Consents    Anesthesia Plan(s) and associated risks, benefits, and realistic alternatives discussed. Questions answered and patient/representative(s) expressed understanding.     - Discussed with:  Patient         Postoperative Care            Comments:           neg OB ROS.       Jeffrey Bean MD

## 2021-03-20 NOTE — PROGRESS NOTES
[id number]  Fetal Heart rate dropping to geraldo in 70's rebounding to 120-150's.  Total duration time 2 1/2 minutes, note, FHR is not consistently in the 70's.    Sudden drop to 70.s, with rebound in less than 20 seconds up to 155, followed by additional sudden drop and return to normal baseline range.  Next 60 seconds writer attempting to get baby back on monitor while turning patient from side to side.  Could hear FHR in 140's, however, EFM not tracing.    Interventions included multiple position changes, SVE, fluid bolus.  By 0204 FHR back in normal range with moderate variability.  Accelerations present.

## 2021-03-20 NOTE — PROVIDER NOTIFICATION
03/20/21 0555   Provider Notification   Provider Name/Title Dr. Childress   Method of Notification Phone   Request Attend Delivery   Notification Reason SVE     MD updated on patient status.  SVE: 10 cm and patient is feeling a lot of pressure and wants to push.  MD will be on the unit within 20 minutes.

## 2021-03-20 NOTE — ANESTHESIA PROCEDURE NOTES
Epidural catheter Procedure Note  Pre-Procedure   Staff -        Anesthesiologist:  Jeffrey Bean MD       Performed By: anesthesiologist       Referred By: Fior       Location: OB       Pre-Anesthestic Checklist: patient identified, IV checked, risks and benefits discussed, informed consent, pre-op evaluation and at physician/surgeon's request  Timeout:       Correct Patient: Yes        Correct Procedure: Yes        Correct Site: Yes        Correct Position: Yes   Procedure Documentation  Procedure: epidural catheter       Patient Position: sitting       Patient Prep/Sterile Barriers: sterile gloves, mask, patient draped       Skin prep: Betadine      Local skin infiltrated with mL of 1% lidocaine.        Insertion Site: L3-4. (midline approach).       Technique: LORT saline        UNRULY at 4 cm.       Needle Type: ToZazzyy needle       Needle Gauge: 17.        Needle Length (Inches): 3.5        Catheter: 19 G.         Catheter threaded easily.         5 cm epidural space.         Threaded 9 cm at skin.        # of attempts: 1 and  # of redirects:     Assessment/Narrative         Paresthesias: No.     Test dose of mL at 02:24.         Test dose negative, 3 minutes after injection, for signs of intravascular, subdural, or intrathecal injection.       Insertion/Infusion Method: LORT saline       Aspiration negative for Heme or CSF via Epidural Catheter.    Medication(s) Administered   0.25% Bupivacaine PF (Epidural), 10 mL  Medication Administration Time: 3/20/2021 2:27 AM

## 2021-03-21 LAB — HGB BLD-MCNC: 9.4 G/DL (ref 11.7–15.7)

## 2021-03-21 PROCEDURE — 250N000013 HC RX MED GY IP 250 OP 250 PS 637: Performed by: OBSTETRICS & GYNECOLOGY

## 2021-03-21 PROCEDURE — 36415 COLL VENOUS BLD VENIPUNCTURE: CPT | Performed by: OBSTETRICS & GYNECOLOGY

## 2021-03-21 PROCEDURE — 85018 HEMOGLOBIN: CPT | Performed by: OBSTETRICS & GYNECOLOGY

## 2021-03-21 PROCEDURE — 120N000001 HC R&B MED SURG/OB

## 2021-03-21 RX ORDER — ACETAMINOPHEN 325 MG/1
650 TABLET ORAL EVERY 4 HOURS PRN
COMMUNITY
Start: 2021-03-21

## 2021-03-21 RX ORDER — IBUPROFEN 800 MG/1
800 TABLET, FILM COATED ORAL EVERY 6 HOURS PRN
Qty: 30 TABLET | Refills: 0 | Status: SHIPPED | OUTPATIENT
Start: 2021-03-21

## 2021-03-21 RX ADMIN — DOCUSATE SODIUM 50 MG AND SENNOSIDES 8.6 MG 1 TABLET: 8.6; 5 TABLET, FILM COATED ORAL at 09:17

## 2021-03-21 RX ADMIN — FAMOTIDINE 20 MG: 20 TABLET ORAL at 21:10

## 2021-03-21 RX ADMIN — DOCUSATE SODIUM 50 MG AND SENNOSIDES 8.6 MG 1 TABLET: 8.6; 5 TABLET, FILM COATED ORAL at 21:10

## 2021-03-21 RX ADMIN — ACETAMINOPHEN 650 MG: 325 TABLET, FILM COATED ORAL at 23:16

## 2021-03-21 RX ADMIN — IBUPROFEN 800 MG: 800 TABLET ORAL at 21:10

## 2021-03-21 RX ADMIN — ACETAMINOPHEN 650 MG: 325 TABLET, FILM COATED ORAL at 17:50

## 2021-03-21 RX ADMIN — ACETAMINOPHEN 650 MG: 325 TABLET, FILM COATED ORAL at 02:35

## 2021-03-21 RX ADMIN — FAMOTIDINE 20 MG: 20 TABLET ORAL at 09:16

## 2021-03-21 RX ADMIN — IBUPROFEN 800 MG: 800 TABLET ORAL at 02:34

## 2021-03-21 RX ADMIN — ACETAMINOPHEN 650 MG: 325 TABLET, FILM COATED ORAL at 14:00

## 2021-03-21 RX ADMIN — IBUPROFEN 800 MG: 800 TABLET ORAL at 09:16

## 2021-03-21 RX ADMIN — IBUPROFEN 800 MG: 800 TABLET ORAL at 15:10

## 2021-03-21 RX ADMIN — ACETAMINOPHEN 650 MG: 325 TABLET, FILM COATED ORAL at 06:33

## 2021-03-21 NOTE — DISCHARGE SUMMARY
[facility]    Discharge Summary  Obstetrics    Date of Admission:  3/19/2021  Date of Discharge:  3/22/2021  Discharging Provider: Yamini      Discharge Diagnoses   Patient Active Problem List   Diagnosis     Tobacco use disorder     CARDIOVASCULAR SCREENING; LDL GOAL LESS THAN 160     Fibromyalgia     Muscle spasm     Wheezing     Encounter for triage in pregnant patient     Indication for care in labor or delivery     Labor and delivery indication for care or intervention      (spontaneous vaginal delivery)         History of Present Illness   Sathya Nelson is a 30 year old female now  who presented to L&D @ 40w5d with contractions. Her pregnancy has been complicated by hep B NI, GERD, and fibromyalgia. Please see her admit H&P for full details of her PMH, PSH, Meds, Allergies and exam on admit.    Hospital Course   The patient had a Normal spontaneous vaginal delivery @ 40w5d, please see her delivery summary for full details.     Her postpartum course was uncomplicated. On postpartum day 2, she was meeting all of her postpartum goals and deemed stable for discharge. She was voiding without difficulty, tolerating a regular diet without nausea and vomiting, her pain was well controlled on oral pain medicines and her lochia was appropriate.    Hgb:   Lab Results   Component Value Date    HGB 9.4 2021    HGB 10.2 2021       Lab Results   Component Value Date    RH Pos 2021    and rhogam was not given    Contraception was discussed and will be addressed at her postpartum appointment.    Instructions:  1) Call for temperature greater than 100.4F, foul smelling vaginal discharge, bleeding more than 1 pad per hour for 2 hrs, pain not controlled by oral pain meds, severe constipation or severe nausea or vomiting.  2) She was instructed to follow-up with her primary OB in 6 weeks for a routine postpartum visit  3) She was instructed to continue her PNV on discharge if  she wished to breast feed her infant.    Ana Laura Childress, DO, DO    Discharge Disposition   Discharged to home   Condition at discharge: Stable    Primary Care Physician   Physician No Ref-Primary    Consultations This Hospital Stay   ANESTHESIOLOGY IP CONSULT  HOME CARE POST PARTUM/ IP CONSULT  LACTATION IP CONSULT    Discharge Orders   No discharge procedures on file.  Discharge Medications   Current Discharge Medication List      START taking these medications    Details   acetaminophen (TYLENOL) 325 MG tablet Take 2 tablets (650 mg) by mouth every 4 hours as needed for mild pain or fever (greater than or equal to 38  C /100.4  F (oral) or 38.5  C/ 101.4  F (core).)  Qty:      Associated Diagnoses:  (spontaneous vaginal delivery)      ibuprofen (ADVIL/MOTRIN) 800 MG tablet Take 1 tablet (800 mg) by mouth every 6 hours as needed for other (cramping)  Qty: 30 tablet, Refills: 0    Associated Diagnoses:  (spontaneous vaginal delivery)      oxyCODONE (ROXICODONE) 5 MG tablet Take 1 tablet (5 mg) by mouth every 6 hours as needed for pain  Qty: 5 tablet, Refills: 0    Associated Diagnoses:  (spontaneous vaginal delivery)         CONTINUE these medications which have NOT CHANGED    Details   Prenatal Vit-Fe Fumarate-FA (PNV PRENATAL PLUS MULTIVITAMIN) 27-1 MG TABS per tablet Take 1 tablet by mouth daily         STOP taking these medications       doxylamine (UNISOM) 25 MG TABS tablet Comments:   Reason for Stopping:         metroNIDAZOLE (FLAGYL) 500 MG tablet Comments:   Reason for Stopping:         ondansetron (ZOFRAN ODT) 4 MG ODT tab Comments:   Reason for Stopping:         pyridOXINE (VITAMIN B6) 25 MG tablet Comments:   Reason for Stopping:         ranitidine (ZANTAC) 150 MG tablet Comments:   Reason for Stopping:         sucralfate (CARAFATE) 1 GM tablet Comments:   Reason for Stopping:         tiZANidine (ZANAFLEX) 2 MG tablet Comments:   Reason for Stopping:             Allergies   Allergies    Allergen Reactions     Amoxicillin Nausea     Penicillins Nausea and Swelling

## 2021-03-21 NOTE — PLAN OF CARE
Pt is up ad grisel, and reports adequate pain control. Family is present and supportive. Pt is attentive to infants needs. Meeting expected goals.

## 2021-03-21 NOTE — ANESTHESIA POSTPROCEDURE EVALUATION
Patient: Sathya Nelson      S/P epidural for labor.   I or my partner was immediately available for management of this patient during epidural analgesia infusion.  VSS.  Doing well. Block resolved.  Neuro at baseline. Denies positional headache. Minimal side effects easily managed w/ PRN meds. No apparent anesthetic complications. No follow-up required.    Jeffrey Bean MD    Note:  Anesthesia Post Evaluation    Last vitals:  Vitals:    03/20/21 1727 03/21/21 0234 03/21/21 0633   BP: (!) 88/52 (!) 84/44 92/58   Pulse: 84 79 71   Resp: 16 15    Temp: 98.4  F (36.9  C) 97.8  F (36.6  C)    SpO2:          Last vitals prior to Anesthesia Care Transfer:      Electronically Signed By: Jeffrey Bean MD  March 21, 2021  7:51 AM

## 2021-03-21 NOTE — PLAN OF CARE
Pt stable throughout shift.  VSS and fundus WNL.  BPs have been lower the past 24 hrs but no other s/s noted.  If pt starts  showing symptoms, can spot check BPs as needed.  Up voiding independently without difficulty.  Breastfeeding well with a nipple shield.  Taking tylenol and ibuprofen, and using a tpump for pain/comfort.  Bonding well with baby.  Providing cares for self and baby independently.  FOB present in room most shift, supportive and helpful with cares.

## 2021-03-21 NOTE — PLAN OF CARE
Pt meeting expected outcomes. Vitals stable. Fundus firm and midline. Denies difficulty voiding. Pain somewhat controlled with ibuprofen and tylenol. Also using T pump and tucks pads. Independent with self cares. Breastfeeding , using a shield. Feedings are getting better, infant seems to be more awake and willing to eat at the breast. Reviewed different holds and ways to wake a sleepy baby.

## 2021-03-21 NOTE — LACTATION NOTE
Lactation follow up visit. Patient stated infant has been breastfeeding often and has become more alert while at breast. Attempted to latch infant on right side without shield. Infant thrusted her tongue once nipple was in mouth. Manually expressed colostrom to nipple and infant had a deep latch but continued to push nipple out with tongue. Once nipple shield was applied infant had coordinated suck. Encouraged patient to offer breast without shield first so that infant can improve latch. Discussed how latch can change when colostrum transitions to fatty milk and patient stated she plans to wean off of shield. Patient did not have any additional questions.

## 2021-03-21 NOTE — PROGRESS NOTES
[facility] Postpartum Note    S:  Sathya Nelson feels sore this morning. Was able to sleep last night. Pain control adequate. Lochia minimal. Voiding. Breast feeding. Mood Good.     O:  Vitals were reviewed  Blood pressure (!) 85/48, pulse 79, temperature 97.3  F (36.3  C), temperature source Oral, resp. rate 14, last menstrual period 06/08/2020, SpO2 100 %, unknown if currently breastfeeding.      General: healthy, alert and no distress  Abd: soft, appropriately tender, fundus firm  Legs: Non-tender, 0+ pitting edema    RH(D)   Date Value Ref Range Status   03/20/2021 Pos  Final       Assessment and Plan:   Postpartum Day #1, status post vaginal delivery, doing well.  -- Routine care  -- F/U 6 weeks w/ Primary OB  -- Discharge meds: ibuprofen, has tylenol at home  -- Contraception: unsure and wants to discuss at 6 weeks    Review prenatal BP and runs /40-60.    Ana Laura Childress, , DO

## 2021-03-22 VITALS
SYSTOLIC BLOOD PRESSURE: 102 MMHG | OXYGEN SATURATION: 100 % | HEART RATE: 92 BPM | TEMPERATURE: 97.5 F | RESPIRATION RATE: 17 BRPM | DIASTOLIC BLOOD PRESSURE: 67 MMHG

## 2021-03-22 PROCEDURE — 250N000013 HC RX MED GY IP 250 OP 250 PS 637: Performed by: OBSTETRICS & GYNECOLOGY

## 2021-03-22 RX ORDER — OXYCODONE HYDROCHLORIDE 5 MG/1
5 TABLET ORAL EVERY 6 HOURS PRN
Qty: 5 TABLET | Refills: 0 | Status: SHIPPED | OUTPATIENT
Start: 2021-03-22 | End: 2021-03-25

## 2021-03-22 RX ADMIN — IBUPROFEN 800 MG: 800 TABLET ORAL at 06:52

## 2021-03-22 RX ADMIN — DOCUSATE SODIUM 50 MG AND SENNOSIDES 8.6 MG 1 TABLET: 8.6; 5 TABLET, FILM COATED ORAL at 09:21

## 2021-03-22 RX ADMIN — FAMOTIDINE 20 MG: 20 TABLET ORAL at 09:21

## 2021-03-22 NOTE — PLAN OF CARE
Pt able to get some rest between cares during the night.  States ordered pain medication keeping her comfortable.  Using tucks occasionally to laceration repair.  FOB present and supportive.

## 2021-03-22 NOTE — PROGRESS NOTES
Patient doing well. Has some tail bone pain and bottom is sore. Tolerating regular diet. Urinating ok. Bleeding moderate/minimal. Still some discomfort despite tylenol and ibuprofen.     Breast feeding.     BP 93/60   Pulse 89   Temp 97.6  F (36.4  C) (Oral)   Resp 18   LMP 06/08/2020 (Exact Date)   SpO2 100%   Breastfeeding Unknown   Gen: well female NAD  FF: umb - 1  Ext: NT no cords  Perineum: appears healing well. No purulence, mild swelling.     A/P  Post partum day #2  Doing well  Routine post partum cares  If bottom not improving with tub soaks advise follow up later this week.   Anticipate discharge today  Script for oxycodone #5 but advised it can cause constipation.     Mia Kc MD on 3/22/2021 at 7:59 AM

## 2021-03-22 NOTE — PROGRESS NOTES
Data: Vital signs within normal limits. Postpartum checks within normal limits - see flow record. Patient eating and drinking normally. Patient able to empty bladder independently and is up ambulating. No apparent signs of infection. Laceration healing well. Patient performing self cares and is able to care for infant.  Action: Patient medicated during the shift for pain and cramping. See MAR. Patient reassessed within 1 hour after each medication and pain was improved - patient stated she was comfortable. Patient education done about normal postpartum findings and when to call the doctor. See flow record.  Response: Positive attachment behaviors observed with infant. Support persons  present.   Plan: Anticipate discharge today.

## 2021-03-22 NOTE — PLAN OF CARE
Data: Vital signs within normal limits. Postpartum checks within normal limits - see flow record. Patient eating and drinking normally. Patient able to empty bladder independently and is up ambulating. No apparent signs of infection. Patient performing self cares and is able to care for infant.  Action: Patient medicated during the shift for cramping. See MAR. Patient reassessed within 1 hour after each medication and pain was improved - patient stated she was comfortable. Response: Positive attachment behaviors observed with infant. FOB present and supportive.  Plan: Adequate for discharge.

## 2021-04-10 ENCOUNTER — HEALTH MAINTENANCE LETTER (OUTPATIENT)
Age: 31
End: 2021-04-10

## 2021-09-11 ENCOUNTER — APPOINTMENT (OUTPATIENT)
Dept: ULTRASOUND IMAGING | Facility: CLINIC | Age: 31
End: 2021-09-11
Attending: EMERGENCY MEDICINE
Payer: COMMERCIAL

## 2021-09-11 ENCOUNTER — HOSPITAL ENCOUNTER (EMERGENCY)
Facility: CLINIC | Age: 31
Discharge: HOME | End: 2021-09-11
Attending: EMERGENCY MEDICINE | Admitting: EMERGENCY MEDICINE
Payer: COMMERCIAL

## 2021-09-11 ENCOUNTER — APPOINTMENT (OUTPATIENT)
Dept: CT IMAGING | Facility: CLINIC | Age: 31
End: 2021-09-11
Attending: EMERGENCY MEDICINE
Payer: COMMERCIAL

## 2021-09-11 VITALS
TEMPERATURE: 98 F | OXYGEN SATURATION: 100 % | DIASTOLIC BLOOD PRESSURE: 74 MMHG | SYSTOLIC BLOOD PRESSURE: 117 MMHG | HEART RATE: 82 BPM | RESPIRATION RATE: 20 BRPM

## 2021-09-11 DIAGNOSIS — R10.9 FLANK PAIN: ICD-10-CM

## 2021-09-11 DIAGNOSIS — N83.201 CYST OF RIGHT OVARY: ICD-10-CM

## 2021-09-11 LAB
ALBUMIN SERPL-MCNC: 3.4 G/DL (ref 3.4–5)
ALBUMIN UR-MCNC: NEGATIVE MG/DL
ALP SERPL-CCNC: 80 U/L (ref 40–150)
ALT SERPL W P-5'-P-CCNC: 16 U/L (ref 0–50)
ANION GAP SERPL CALCULATED.3IONS-SCNC: 3 MMOL/L (ref 3–14)
APPEARANCE UR: CLEAR
AST SERPL W P-5'-P-CCNC: 10 U/L (ref 0–45)
BACTERIA #/AREA URNS HPF: ABNORMAL /HPF
BASOPHILS # BLD AUTO: 0.1 10E3/UL (ref 0–0.2)
BASOPHILS NFR BLD AUTO: 0 %
BILIRUB SERPL-MCNC: 0.3 MG/DL (ref 0.2–1.3)
BILIRUB UR QL STRIP: NEGATIVE
BUN SERPL-MCNC: 11 MG/DL (ref 7–30)
CALCIUM SERPL-MCNC: 8.8 MG/DL (ref 8.5–10.1)
CHLORIDE BLD-SCNC: 107 MMOL/L (ref 94–109)
CO2 SERPL-SCNC: 29 MMOL/L (ref 20–32)
COLOR UR AUTO: YELLOW
CREAT SERPL-MCNC: 0.73 MG/DL (ref 0.52–1.04)
EOSINOPHIL # BLD AUTO: 0.1 10E3/UL (ref 0–0.7)
EOSINOPHIL NFR BLD AUTO: 0 %
ERYTHROCYTE [DISTWIDTH] IN BLOOD BY AUTOMATED COUNT: 16.2 % (ref 10–15)
GFR SERPL CREATININE-BSD FRML MDRD: >90 ML/MIN/1.73M2
GLUCOSE BLD-MCNC: 88 MG/DL (ref 70–99)
GLUCOSE UR STRIP-MCNC: NEGATIVE MG/DL
HCG SERPL QL: NEGATIVE
HCT VFR BLD AUTO: 34 % (ref 35–47)
HGB BLD-MCNC: 10.8 G/DL (ref 11.7–15.7)
HGB UR QL STRIP: NEGATIVE
HOLD SPECIMEN: NORMAL
IMM GRANULOCYTES # BLD: 0.2 10E3/UL
IMM GRANULOCYTES NFR BLD: 1 %
KETONES UR STRIP-MCNC: NEGATIVE MG/DL
LEUKOCYTE ESTERASE UR QL STRIP: ABNORMAL
LYMPHOCYTES # BLD AUTO: 1.7 10E3/UL (ref 0.8–5.3)
LYMPHOCYTES NFR BLD AUTO: 6 %
MCH RBC QN AUTO: 26.9 PG (ref 26.5–33)
MCHC RBC AUTO-ENTMCNC: 31.8 G/DL (ref 31.5–36.5)
MCV RBC AUTO: 85 FL (ref 78–100)
MONOCYTES # BLD AUTO: 1.8 10E3/UL (ref 0–1.3)
MONOCYTES NFR BLD AUTO: 7 %
MUCOUS THREADS #/AREA URNS LPF: PRESENT /LPF
NEUTROPHILS # BLD AUTO: 23.9 10E3/UL (ref 1.6–8.3)
NEUTROPHILS NFR BLD AUTO: 86 %
NITRATE UR QL: NEGATIVE
NRBC # BLD AUTO: 0 10E3/UL
NRBC BLD AUTO-RTO: 0 /100
PH UR STRIP: 5.5 [PH] (ref 5–7)
PLATELET # BLD AUTO: 257 10E3/UL (ref 150–450)
POTASSIUM BLD-SCNC: 3.4 MMOL/L (ref 3.4–5.3)
PROT SERPL-MCNC: 7.8 G/DL (ref 6.8–8.8)
RBC # BLD AUTO: 4.02 10E6/UL (ref 3.8–5.2)
RBC URINE: <1 /HPF
SODIUM SERPL-SCNC: 139 MMOL/L (ref 133–144)
SP GR UR STRIP: 1.03 (ref 1–1.03)
SQUAMOUS EPITHELIAL: 1 /HPF
UROBILINOGEN UR STRIP-MCNC: NORMAL MG/DL
WBC # BLD AUTO: 27.7 10E3/UL (ref 4–11)
WBC URINE: 2 /HPF

## 2021-09-11 PROCEDURE — 258N000003 HC RX IP 258 OP 636: Performed by: EMERGENCY MEDICINE

## 2021-09-11 PROCEDURE — 81001 URINALYSIS AUTO W/SCOPE: CPT | Performed by: EMERGENCY MEDICINE

## 2021-09-11 PROCEDURE — 36415 COLL VENOUS BLD VENIPUNCTURE: CPT | Performed by: EMERGENCY MEDICINE

## 2021-09-11 PROCEDURE — 96361 HYDRATE IV INFUSION ADD-ON: CPT

## 2021-09-11 PROCEDURE — 74176 CT ABD & PELVIS W/O CONTRAST: CPT

## 2021-09-11 PROCEDURE — 85025 COMPLETE CBC W/AUTO DIFF WBC: CPT | Performed by: EMERGENCY MEDICINE

## 2021-09-11 PROCEDURE — 99285 EMERGENCY DEPT VISIT HI MDM: CPT | Mod: 25

## 2021-09-11 PROCEDURE — 76830 TRANSVAGINAL US NON-OB: CPT

## 2021-09-11 PROCEDURE — 96376 TX/PRO/DX INJ SAME DRUG ADON: CPT

## 2021-09-11 PROCEDURE — 96374 THER/PROPH/DIAG INJ IV PUSH: CPT

## 2021-09-11 PROCEDURE — 80053 COMPREHEN METABOLIC PANEL: CPT | Performed by: EMERGENCY MEDICINE

## 2021-09-11 PROCEDURE — 250N000011 HC RX IP 250 OP 636: Performed by: EMERGENCY MEDICINE

## 2021-09-11 PROCEDURE — 96375 TX/PRO/DX INJ NEW DRUG ADDON: CPT

## 2021-09-11 PROCEDURE — 84703 CHORIONIC GONADOTROPIN ASSAY: CPT | Performed by: EMERGENCY MEDICINE

## 2021-09-11 PROCEDURE — 87086 URINE CULTURE/COLONY COUNT: CPT | Performed by: EMERGENCY MEDICINE

## 2021-09-11 RX ORDER — ONDANSETRON 4 MG/1
4 TABLET, ORALLY DISINTEGRATING ORAL EVERY 6 HOURS PRN
Qty: 10 TABLET | Refills: 0 | Status: SHIPPED | OUTPATIENT
Start: 2021-09-11 | End: 2021-09-14

## 2021-09-11 RX ORDER — CEFDINIR 300 MG/1
300 CAPSULE ORAL 2 TIMES DAILY
Qty: 14 CAPSULE | Refills: 0 | Status: SHIPPED | OUTPATIENT
Start: 2021-09-11 | End: 2021-09-18

## 2021-09-11 RX ORDER — HYDROMORPHONE HYDROCHLORIDE 1 MG/ML
0.5 INJECTION, SOLUTION INTRAMUSCULAR; INTRAVENOUS; SUBCUTANEOUS
Status: DISCONTINUED | OUTPATIENT
Start: 2021-09-11 | End: 2021-09-11 | Stop reason: HOSPADM

## 2021-09-11 RX ORDER — OXYCODONE HYDROCHLORIDE 5 MG/1
5 TABLET ORAL EVERY 6 HOURS PRN
Qty: 12 TABLET | Refills: 0 | Status: SHIPPED | OUTPATIENT
Start: 2021-09-11

## 2021-09-11 RX ORDER — MORPHINE SULFATE 4 MG/ML
4 INJECTION, SOLUTION INTRAMUSCULAR; INTRAVENOUS
Status: DISCONTINUED | OUTPATIENT
Start: 2021-09-11 | End: 2021-09-11 | Stop reason: HOSPADM

## 2021-09-11 RX ORDER — SODIUM CHLORIDE 9 MG/ML
INJECTION, SOLUTION INTRAVENOUS CONTINUOUS
Status: DISCONTINUED | OUTPATIENT
Start: 2021-09-11 | End: 2021-09-11 | Stop reason: HOSPADM

## 2021-09-11 RX ORDER — ONDANSETRON 2 MG/ML
4 INJECTION INTRAMUSCULAR; INTRAVENOUS EVERY 30 MIN PRN
Status: DISCONTINUED | OUTPATIENT
Start: 2021-09-11 | End: 2021-09-11 | Stop reason: HOSPADM

## 2021-09-11 RX ADMIN — SODIUM CHLORIDE 1000 ML: 9 INJECTION, SOLUTION INTRAVENOUS at 11:43

## 2021-09-11 RX ADMIN — MORPHINE SULFATE 4 MG: 4 INJECTION INTRAVENOUS at 12:51

## 2021-09-11 RX ADMIN — ONDANSETRON 4 MG: 2 INJECTION INTRAMUSCULAR; INTRAVENOUS at 11:44

## 2021-09-11 RX ADMIN — MORPHINE SULFATE 4 MG: 4 INJECTION INTRAVENOUS at 11:44

## 2021-09-11 RX ADMIN — HYDROMORPHONE HYDROCHLORIDE 0.5 MG: 1 INJECTION, SOLUTION INTRAMUSCULAR; INTRAVENOUS; SUBCUTANEOUS at 15:53

## 2021-09-11 ASSESSMENT — ENCOUNTER SYMPTOMS
NAUSEA: 1
APPETITE CHANGE: 1
ABDOMINAL PAIN: 1
VOMITING: 1

## 2021-09-11 NOTE — LETTER
September 11, 2021      To Whom It May Concern:      Sathya Nelson was seen in our Emergency Department today, 09/11/21.  I expect her condition to improve over the next 3 days.  She may return to work/school when improved.    Sincerely,        Behzad Espitia MD

## 2021-09-11 NOTE — ED TRIAGE NOTES
Arrives with left side flank and abdomen pain that started at 0300 as well as vomiting, alert and oriented, ABCs intact.

## 2021-09-11 NOTE — ED PROVIDER NOTES
History     Chief Complaint:  Flank Pain and Vomiting      HPI   Sathya Nelson is a 30 year old female with a medical history of fibromyalgia, tobacco use disorder, and recent childbirth without any prior surgeries who presents with flank pain and vomiting. For the past few days, the patient has felt soreness on the left side of her abdomen. Then, at approximately 0300 today, the patient woke up to sudden, sharp left sided abdominal pain. She has had two episodes of emesis with traces of blood since then and, during one of these episodes, she lost control of her bladder. Here, the patient states that she has not been able to tolerate PO intake. The patient denies any previous surgeries or similar past symptoms.       Review of Systems   Constitutional: Positive for appetite change (decreased).   Gastrointestinal: Positive for abdominal pain (left sided), nausea and vomiting (with traces of blood, x2).   All other systems reviewed and are negative.      Allergies:  Amoxicillin  Penicillins      Medications:    acetaminophen (TYLENOL) 325 MG tablet  ibuprofen (ADVIL/MOTRIN) 800 MG tablet      Past Medical History:    Fibromyalgia     Labor and delivery indication for care or intervention  Indication for care in labor or delivery   Encounter for triage in pregnant patient  Muscle spasm  Wheezing  Tobacco use disorder  Cardiovascular screening; LDL goal less than 160     Past Surgical History:    The patient denies any surgical history.     Family History:    Diabetes mellitus (mother)  Hypertension (mother)       Physical Exam     Patient Vitals for the past 24 hrs:   BP Temp Temp src Pulse Resp SpO2   21 1200 117/74 -- -- 82 -- 100 %   21 1145 (!) 113/98 -- -- 86 -- 100 %   21 1130 93/59 -- -- 93 -- 100 %   21 1014 108/47 98  F (36.7  C) Temporal 96 20 100 %       Physical Exam  Constitutional:       Appearance: She is well-developed.   HENT:      Right Ear: External ear normal.       Left Ear: External ear normal.      Mouth/Throat:      Mouth: Mucous membranes are moist.      Pharynx: Oropharynx is clear. No oropharyngeal exudate or posterior oropharyngeal erythema.   Eyes:      General: No scleral icterus.     Extraocular Movements: Extraocular movements intact.      Conjunctiva/sclera: Conjunctivae normal.      Pupils: Pupils are equal, round, and reactive to light.   Cardiovascular:      Rate and Rhythm: Normal rate and regular rhythm.      Heart sounds: Normal heart sounds. No murmur heard.   No friction rub. No gallop.    Pulmonary:      Effort: Pulmonary effort is normal. No respiratory distress.      Breath sounds: Normal breath sounds. No wheezing or rales.   Abdominal:      General: Bowel sounds are normal. There is no distension.      Palpations: Abdomen is soft. There is no mass.      Tenderness: There is abdominal tenderness. There is left CVA tenderness. There is no right CVA tenderness.      Comments: L mid abd and L CVAT   Musculoskeletal:         General: Normal range of motion.      Cervical back: Normal range of motion and neck supple.   Skin:     General: Skin is warm and dry.      Capillary Refill: Capillary refill takes less than 2 seconds.      Findings: No rash.   Neurological:      Mental Status: She is alert.         Emergency Department Course     Imaging:  US Pelvis Cmplt w Transvag & Doppler LmtPel Duplex Limited   Final Result   IMPRESSION:   Large right ovarian cyst without evidence of torsion. Consider   follow-up in 6-8 weeks to confirm resolution.         ANJU ABREU MD            SYSTEM ID:  [id number]      CT Abdomen Pelvis w/o Contrast   Final Result   IMPRESSION:    1.  Large 5.5 cm right pelvic cystic structure likely ovarian.   Follow-up pelvic ultrasound suggested for better characterization.      2.  No urinary tract calculi or hydronephrosis. No bowel obstruction,   diverticulitis or appendicitis.      RICK GRIFFIN MD            SYSTEM ID:  [id number]           Laboratory:  Urine Culture: Pending  CBC: WBC 27.7 (high), HGB 10.8 (low), Hematocrit 34.0 (low), RDW 16.2 (high), Absolute Neutrophils 23.9 (high), Absolute Monocytes 1.8 (high), Absolute Immature Granulocytes 0.2 (high), o/w WNL ()  CMP: WNL (Cr 0.73)  HCG Qualitative Pregnancy: Negative   UA with Microscopic Reflex to Culture: Leukocyte Esterase Trace (abnormal), Bacteria Few (abnormal), Mucus Present (abnormal), o/w WNL     Emergency Department Course:    Reviewed:  I reviewed nursing notes, vitals, past history and care everywhere    Assessments:  1129 I obtained history and examined the patient as noted above.   1427 I rechecked the patient and explained findings.     Interventions:  1143: 0.9% sodium chloride BOLUS 1,000 mL, IV  1144: ondansetron (ZOFRAN) injection 4 mg, IV  1144: morphine (PF) injection 4 mg, IV  1251: morphine (PF) injection 4 mg, IV   1553: HYDROmorphone (PF) (DILAUDID) injection 0.5 mg, IV    Disposition:  The patient was discharged to home.    Impression & Plan      Medical Decision Making:  The patient presents with concern for flank pain and vomiting. The patient has tenderness in the left CVA area as well as left mid abdominal pain. Her vitals are normal. Labs were obtained and fluids as well as pain medications were given. Her labs show leukocytosis although no acute of a urine infection. We did send her urine for culture. There were no signs of kidney stones or hydronephrosis on her CT today, but there is a large right sided pelvic cystic structure, likely ovarian etiology. I then performed to evaluate this right sided ovarian cyst. It does not show any evidence of torsion. A follow-up is recommended in six to eight weeks. The patient was feeling better here. I did elect to start antibiotics given her elevated white count and possible UTI based on her urine. We will send it out for culture and see what it grows. I did recommend follow-up with OB/GYN. She follow-ups with  [facility], so she will do that right away. I did ask her to keep close eye on symptoms, given that we were not able to find the cause of her pain as well as given her elevated white count. She was given a work note for several days off. She should start antibiotics today and return if her symptoms worsening, or if she has severe pain, vomiting, high fever, or any other concerns.       Diagnosis:    ICD-10-CM    1. Cyst of right ovary  N83.201    2. Flank pain  R10.9        Discharge Medications:  New Prescriptions    CEFDINIR (OMNICEF) 300 MG CAPSULE    Take 1 capsule (300 mg) by mouth 2 times daily for 7 days    ONDANSETRON (ZOFRAN ODT) 4 MG ODT TAB    Take 1 tablet (4 mg) by mouth every 6 hours as needed for nausea    OXYCODONE (ROXICODONE) 5 MG TABLET    Take 1 tablet (5 mg) by mouth every 6 hours as needed for pain         Scribe Disclosure:  I, Jane Booth, am serving as a scribe at 11:29 AM on 9/11/2021 to document services personally performed by Behzad Espitia MD based on my observations and the provider's statements to me.      Behzad Espitia MD  09/11/21 [id number]

## 2021-09-11 NOTE — DISCHARGE INSTRUCTIONS
Zofran for nausea and oxycodone for pain as needed.  We sent over urine culture, but we will start antibiotics today.  Push fluids and rest and follow-up with your OB/GYN on ovarian cyst.  Do not take oxycodone and work or drive car or operate heavy machinery.  Return if he has severe uncontrolled pain, fever, severe vomiting, trouble urinating or any other new symptoms.  Opioid Medication Information    You have been given a prescription for an opioid (narcotic) pain medicine and/or have received a pain medicine while here in the Emergency Department. These medicines can make you drowsy or impaired. You must not drive, operate dangerous equipment, or engage in any other dangerous activities while taking these medications. If you drive while taking these medications, you could be arrested for driving under the influence (DUI). Do not drink any alcohol while you are taking these medications.     Opioid pain medications can cause addiction. If you have a history of chemical dependency of any type, you are at a higher risk of becoming addicted to pain medications.  Only take these prescribed medications to treat your pain when all other options have been tried. Take it for as short a time and as few doses as possible. Store your pain pills in a secure place, as they are frequently stolen and provide a dangerous opportunity for children or visitors in your house to start abusing these powerful medications. We will not replace any lost or stolen medicine.    If you do not finish your medication, it is a good idea to get rid of it but please do not flush it down the toilet. Please dispose of the remaining medication at a local pharmacy or law enforcement facility. The [place] [employer] has additional information on medication disposal: https://www.pca.[facility].[place].us/living-green/managing-unwanted-medications.      Many prescription pain medications contain Tylenol  (acetaminophen), including Vicodin ,  Tylenol #3 , Norco , Lortab , and Percocet .  You should not take any extra pills of Tylenol  if you are using these prescription medications or you can get very sick.  Do not ever take more than 3000 mg of acetaminophen in any 24 hour period.    All opioids tend to cause constipation. Drink plenty of water and eat foods that have a lot of fiber, such as fruits, vegetables, prune juice, apple juice and high fiber cereal.  Take a laxative if you don t move your bowels at least every other day. Miralax , Milk of Magnesia, Colace , or Senna  can be used to keep you regular.

## 2021-09-12 LAB — BACTERIA UR CULT: NO GROWTH

## 2021-09-19 ENCOUNTER — HEALTH MAINTENANCE LETTER (OUTPATIENT)
Age: 31
End: 2021-09-19

## 2022-05-01 ENCOUNTER — HEALTH MAINTENANCE LETTER (OUTPATIENT)
Age: 32
End: 2022-05-01

## 2022-11-21 ENCOUNTER — HEALTH MAINTENANCE LETTER (OUTPATIENT)
Age: 32
End: 2022-11-21

## 2023-06-02 ENCOUNTER — HEALTH MAINTENANCE LETTER (OUTPATIENT)
Age: 33
End: 2023-06-02

## 2024-06-22 ENCOUNTER — HEALTH MAINTENANCE LETTER (OUTPATIENT)
Age: 34
End: 2024-06-22